# Patient Record
Sex: FEMALE | Race: BLACK OR AFRICAN AMERICAN | NOT HISPANIC OR LATINO | Employment: FULL TIME | URBAN - METROPOLITAN AREA
[De-identification: names, ages, dates, MRNs, and addresses within clinical notes are randomized per-mention and may not be internally consistent; named-entity substitution may affect disease eponyms.]

---

## 2019-10-31 ENCOUNTER — HISTORICAL (OUTPATIENT)
Dept: ADMINISTRATIVE | Facility: HOSPITAL | Age: 59
End: 2019-10-31

## 2019-11-02 LAB — FINAL CULTURE: NORMAL

## 2020-07-22 ENCOUNTER — HISTORICAL (OUTPATIENT)
Dept: ADMINISTRATIVE | Facility: HOSPITAL | Age: 60
End: 2020-07-22

## 2020-07-22 LAB
BILIRUB SERPL-MCNC: NEGATIVE MG/DL
BLOOD URINE, POC: NORMAL
CLARITY, POC UA: CLEAR
COLOR, POC UA: NORMAL
GLUCOSE UR QL STRIP: NEGATIVE
KETONES UR QL STRIP: NEGATIVE
LEUKOCYTE EST, POC UA: NEGATIVE
NITRITE, POC UA: NEGATIVE
PH, POC UA: 7.5
PROTEIN, POC: NEGATIVE
SPECIFIC GRAVITY, POC UA: 1.02
UROBILINOGEN, POC UA: NORMAL

## 2021-07-11 ENCOUNTER — HISTORICAL (OUTPATIENT)
Dept: ADMINISTRATIVE | Facility: HOSPITAL | Age: 61
End: 2021-07-11

## 2021-07-11 LAB — SARS-COV-2 RNA RESP QL NAA+PROBE: DETECTED

## 2021-07-14 ENCOUNTER — HISTORICAL (OUTPATIENT)
Dept: INFECTIOUS DISEASES | Facility: HOSPITAL | Age: 61
End: 2021-07-14

## 2022-01-04 ENCOUNTER — HISTORICAL (OUTPATIENT)
Dept: ADMINISTRATIVE | Facility: HOSPITAL | Age: 62
End: 2022-01-04

## 2022-01-04 LAB
FLUAV AG UPPER RESP QL IA.RAPID: NEGATIVE
FLUBV AG UPPER RESP QL IA.RAPID: NEGATIVE
SARS-COV-2 RNA RESP QL NAA+PROBE: DETECTED
STREP A PCR (OHS): NOT DETECTED

## 2022-04-10 ENCOUNTER — HISTORICAL (OUTPATIENT)
Dept: ADMINISTRATIVE | Facility: HOSPITAL | Age: 62
End: 2022-04-10
Payer: MEDICAID

## 2022-04-11 ENCOUNTER — HISTORICAL (OUTPATIENT)
Dept: ADMINISTRATIVE | Facility: HOSPITAL | Age: 62
End: 2022-04-11
Payer: MEDICAID

## 2022-04-28 VITALS
DIASTOLIC BLOOD PRESSURE: 66 MMHG | WEIGHT: 184.75 LBS | SYSTOLIC BLOOD PRESSURE: 114 MMHG | HEIGHT: 66 IN | BODY MASS INDEX: 29.69 KG/M2 | OXYGEN SATURATION: 100 %

## 2022-04-28 VITALS
SYSTOLIC BLOOD PRESSURE: 114 MMHG | OXYGEN SATURATION: 100 % | DIASTOLIC BLOOD PRESSURE: 66 MMHG | HEIGHT: 66 IN | BODY MASS INDEX: 29.69 KG/M2 | WEIGHT: 184.75 LBS

## 2022-05-03 NOTE — HISTORICAL [facility] C[facility]ER
This is a historical note converted from [employer]. Formatting and pictures may have been removed.  Please reference [employer] for original formatting and attached multimedia. Chief Complaint  Sore throat, weakness, nausea, cough  History of Present Illness  Patient presents to Urgent Care for stated complaint during Covid health crisis.  61-year-old female presented to clinic reporting of a sore throat weakness?and cough that started today?denies any positive exposure to COVID-19?denies any other symptoms  Review of Systems  Constitutional: negative except as stated in HPI  Eye: negative except as stated in HPI  ENT: negative except as stated in HPI  Respiratory: negative except as stated in HPI  Cardiovascular: negative except as stated in HPI  Gastrointestinal: negative except as stated in HPI  Genitourinary: negative except as stated in HPI  Hema/Lymph: negative except as stated in HPI  Endocrine: negative except as stated in HPI  Immunologic: negative except as stated in HPI  Musculoskeletal: negative except as stated in HPI  Integumentary: negative except as stated in HPI  Neurologic: negative except as stated in HPI  Physical Exam  Vitals & Measurements  T:?37.2? ?C (Oral)? HR:?86(Peripheral)? RR:?18? BP:?114/66?  HT:?167.00?cm? WT:?83.800?kg? BMI:?30.05?  Vital signs: Reviewed  General: in no apparent distress, appropriate for age  Head: no signs of head trauma  Eyes: pupils are reactive. ?Extraocular motions intact, conjunctival pink.?  ENT: Bilateral ear canal clear, no edema, no discharge or bleeding. ?Bilateral TMs intact, pearly gray, no effusion, no retraction or perforation or bulging.  Nose: no sinus tenderness, nasal turbinate normal no erythema, clear nasal discharge.  Mouth: posterior pharynx-clear, uvula midline.  Neck :no adenopathy, supple, full range of motion, nontender  Respiratory: clear breath sounds bilaterally. ?No wheezing  Cardiac :regular, no murmur  Musculoskeletal: Moves all extremities,  ambulatory without assistant  Neurologic: Cranial nerves grossly intact, no signs of peripheral neurological deficit  Assessment/Plan  1.?Flu-like symptoms?R68.89  Discussed physical exam findings and test results with patient COVID-19 PCR, flu a?and B PCR, strep PCR pending?we will notify results and treat accordingly?if needed?continue social distancing mask wearing good hand hygiene and follow CDC recommendation about COVID-19, stay hydrated with fluids specially water, Tylenol Motrin as needed for body aches or fever.?Continue medication as prescribed by your PCP.  Instructed patient to notify his primary care provider regarding the visit today and schedule follow-up appointment in 2 to 3 days if needed  Instructed patient to come back to clinic or go to emergency room department if symptom worsens or no improvement or for any other reasons  Questions elicited and answered  Patient verbalized understanding of discharge instructions, will read discharge education instructions  Ordered:  COVID/FLU A&B PCR, Routine collect, Nasopharyngeal Swab, Collected, 01/04/22 20:16:00 CST, Stop date 01/04/22 20:16:00 CST, Nurse collect, Flu-like symptoms  Office/Outpatient Visit Level 3 Established [id number] PC, Flu-like symptoms  Sore throat, 01/04/22 20:41:00 CST  ?  2.?Sore throat?J02.9  ?As discussed in #1  Ordered:  Office/Outpatient Visit Level 3 Established 96608 PC, Flu-like symptoms  Sore throat, 01/04/22 20:41:00 CST  Strep Group A by PCR, Stat collect, Throat, Collected, 01/04/22 20:17:00 CST, Stop date 01/04/22 20:17:00 CST, Nurse collect, Sore throat  ?   Problem List/Past Medical History  Ongoing  Hypothyroid  Historical  Hypothyroid  Procedure/Surgical History  Casirivi and imdevi inj (07/14/2021)  Repair of wrist   Medications  amLODIPine 5 mg oral tablet, 5 mg= 1 tab(s), Oral, Daily  Claritin 10 mg oral tablet, 10 mg= 1 tab(s), Oral, Daily, 1 refills  cyclobenzaprine 5 mg oral tablet, 5 mg= 1 tab(s), Oral, At  Bedtime, PRN  Flonase 50 mcg/inh nasal spray, 2 spray(s), Nasal, Daily, 1 refills  hydrochlorothiazide 25 mg oral tablet, 25 mg= 1 tab(s), Oral, Daily  meloxicam 7.5 mg oral tablet, 7.5 mg= 1 tab(s), Oral, Daily, PRN, 2 refills  paroxetine 10 mg oral tablet, 10 mg= 1 tab(s), Oral, qAM  Synthroid 75 mcg (0.075 mg) oral tablet, 0.075 mg= 1 tab(s), Oral, Daily  Allergies  No Known Allergies  Social History  Abuse/Neglect  No, No, Yes, 07/22/2020  Alcohol - Denies Alcohol Use, 07/25/2012  Never, 10/31/2019  Substance Use - Denies Substance Abuse, 07/25/2012  Tobacco  Former smoker, quit more than 30 days ago, N/A, 07/22/2020  Family History  Cancer: Father.  Diabetes mellitus: Father.  Hypertension.: Father.  Lupus: Mother.  Health Maintenance  Health Maintenance  ???Pending?(in the next year)  ??? ??OverDue  ??? ? ? ?Cervical Cancer Screening due??09/08/11??and every 3??year(s)  ??? ? ? ?Breast Cancer Screening due??12/08/11??and every 2??year(s)  ??? ? ? ?Diabetes Screening due??11/15/18??and every 3??year(s)  ??? ? ? ?Influenza Vaccine due??10/01/21??and every 1??day(s)  ??? ??Due?  ??? ? ? ?Alcohol Misuse Screening due??01/02/22??and every 1??year(s)  ??? ? ? ?Aspirin Therapy for CVD Prevention due??01/04/22??and every 1??year(s)  ??? ? ? ?Colorectal Screening due??01/04/22??Unknown Frequency  ??? ? ? ?Lipid Screening due??01/04/22??Unknown Frequency  ??? ? ? ?Tetanus Vaccine due??01/04/22??and every 10??year(s)  ??? ? ? ?Zoster Vaccine due??01/04/22??Unknown Frequency  ??? ??Due In Future?  ??? ? ? ?Obesity Screening not due until??01/01/23??and every 1??year(s)  ???Satisfied?(in the past 1 year)  ??? ??Satisfied?  ??? ? ? ?ADL Screening on??01/04/22.??Satisfied by Lyly Pérez  ??? ? ? ?Blood Pressure Screening on??01/04/22.??Satisfied by Lyly Pérez  ??? ? ? ?Body Mass Index Check on??01/04/22.??Satisfied by Lyly Pérez  ??? ? ? ?Depression Screening on??01/04/22.??Satisfied by Lyly Pérez  ??? ? ?  ?Influenza Vaccine on??01/04/22.??Satisfied by Lyly Pérez  ??? ? ? ?Obesity Screening on??01/04/22.??Satisfied by Lyly Pérez  ?

## 2022-08-05 ENCOUNTER — OFFICE VISIT (OUTPATIENT)
Dept: URGENT CARE | Facility: CLINIC | Age: 62
End: 2022-08-05
Payer: MEDICAID

## 2022-08-05 VITALS
OXYGEN SATURATION: 100 % | BODY MASS INDEX: 30.47 KG/M2 | HEIGHT: 66 IN | HEART RATE: 63 BPM | WEIGHT: 189.63 LBS | RESPIRATION RATE: 19 BRPM | DIASTOLIC BLOOD PRESSURE: 78 MMHG | SYSTOLIC BLOOD PRESSURE: 128 MMHG | TEMPERATURE: 98 F

## 2022-08-05 DIAGNOSIS — U07.1 COVID: ICD-10-CM

## 2022-08-05 DIAGNOSIS — H66.92 LEFT OTITIS MEDIA, UNSPECIFIED OTITIS MEDIA TYPE: Primary | ICD-10-CM

## 2022-08-05 DIAGNOSIS — Z11.52 ENCOUNTER FOR SCREENING FOR COVID-19: ICD-10-CM

## 2022-08-05 DIAGNOSIS — R68.89 FLU-LIKE SYMPTOMS: ICD-10-CM

## 2022-08-05 DIAGNOSIS — J02.9 SORE THROAT: ICD-10-CM

## 2022-08-05 DIAGNOSIS — U07.1 COVID-19 VIRUS DETECTED: ICD-10-CM

## 2022-08-05 LAB
CTP QC/QA: YES
SARS-COV-2 RDRP RESP QL NAA+PROBE: POSITIVE

## 2022-08-05 PROCEDURE — U0002 COVID-19 LAB TEST NON-CDC: HCPCS | Mod: PBBFAC | Performed by: NURSE PRACTITIONER

## 2022-08-05 PROCEDURE — 99213 OFFICE O/P EST LOW 20 MIN: CPT | Mod: S$PBB,,, | Performed by: NURSE PRACTITIONER

## 2022-08-05 PROCEDURE — 99213 OFFICE O/P EST LOW 20 MIN: CPT | Mod: PBBFAC | Performed by: NURSE PRACTITIONER

## 2022-08-05 PROCEDURE — 99213 PR OFFICE/OUTPT VISIT, EST, LEVL III, 20-29 MIN: ICD-10-PCS | Mod: S$PBB,,, | Performed by: NURSE PRACTITIONER

## 2022-08-05 RX ORDER — AMLODIPINE BESYLATE 5 MG/1
5 TABLET ORAL DAILY
COMMUNITY
Start: 2022-06-20 | End: 2023-04-04

## 2022-08-05 RX ORDER — LEVOTHYROXINE SODIUM 75 UG/1
75 TABLET ORAL EVERY MORNING
COMMUNITY
Start: 2022-07-28

## 2022-08-05 RX ORDER — HYDROCHLOROTHIAZIDE 25 MG/1
25 TABLET ORAL DAILY
COMMUNITY
Start: 2022-05-13 | End: 2023-04-04

## 2022-08-05 RX ORDER — FLUTICASONE PROPIONATE 50 MCG
1 SPRAY, SUSPENSION (ML) NASAL 2 TIMES DAILY
Status: ON HOLD | COMMUNITY
Start: 2022-08-03 | End: 2023-01-11 | Stop reason: HOSPADM

## 2022-08-05 RX ORDER — LORATADINE 10 MG/1
1 TABLET ORAL DAILY
COMMUNITY
Start: 2022-05-13 | End: 2023-06-29

## 2022-08-05 RX ORDER — AMOXICILLIN 875 MG/1
875 TABLET, FILM COATED ORAL 2 TIMES DAILY
Qty: 20 TABLET | Refills: 0 | Status: SHIPPED | OUTPATIENT
Start: 2022-08-05 | End: 2022-08-15

## 2022-08-05 NOTE — LETTER
August 5, 2022      [facility]  [address] [address]  Phone: [phone]       Patient: Maricel Sepulveda   YOB: 1960  Date of Visit: 08/05/2022    To Whom It May Concern:    Tevin Sepulveda  was at [facility] on 08/05/2022. The patient may return to work/school on 08/11/2022 with no restrictions. If you have any questions or concerns, or if I can be of further assistance, please do not hesitate to contact me.    Sincerely,    Bettye Deleon LPN

## 2022-08-05 NOTE — PROGRESS NOTES
"Subjective:       Patient ID: Maricel Sepulveda is a 62 y.o. female.    Vitals:  height is 5' 5.75" (1.67 m) and weight is 86 kg (189 lb 9.6 oz). Her oral temperature is 98 °F (36.7 °C). Her blood pressure is 128/78 and her pulse is 63. Her respiration is 19 and oxygen saturation is 100%.     Chief Complaint: Nasal Congestion, Cough, and Sore Throat (Pt reports positive covid at home)    Pt is a 62 y.o. female who complains of congestion, headache and bilateral sinus pain for 10 days.   denies  shortness of breath and wheezing. Takin claritin d, flonase with little relief.         Constitution: Negative.   HENT: Positive for ear pain, congestion, sinus pain and sinus pressure.    Neck: neck negative.   Cardiovascular: Negative.    Respiratory: Negative.        Objective:      Physical Exam   Constitutional: She is oriented to person, place, and time. She appears well-developed.   HENT:   Head: Normocephalic.   Ears:   Right Ear: Tympanic membrane normal.   Left Ear: A middle ear effusion is present.   Nose: Congestion present.   Eyes: Conjunctivae and EOM are normal. Pupils are equal, round, and reactive to light.   Neck: Neck supple.   Cardiovascular: Normal rate, regular rhythm and normal heart sounds.   Pulmonary/Chest: Effort normal and breath sounds normal.   Musculoskeletal: Normal range of motion.         General: Normal range of motion.   Neurological: She is alert and oriented to person, place, and time.   Skin: Skin is warm and dry. Capillary refill takes less than 2 seconds.   Psychiatric: Her behavior is normal.   Vitals reviewed.        Assessment:       1. Left otitis media, unspecified otitis media type    2. Encounter for screening for COVID-19    3. Flu-like symptoms    4. Sore throat    5. COVID            Office Visit on 08/05/2022   Component Date Value Ref Range Status    POC Rapid COVID 08/05/2022 Positive (A) Negative Final     Acceptable 08/05/2022 Yes   Final        No results " found.   Plan:       Medication as ordered. May use humidifier. If no improvement in symptoms after medication complete then rtc.     Left otitis media, unspecified otitis media type  -     amoxicillin (AMOXIL) 875 MG tablet; Take 1 tablet (875 mg total) by mouth 2 (two) times daily. for 10 days  Dispense: 20 tablet; Refill: 0    Encounter for screening for COVID-19  -     POCT COVID-19 Rapid Screening    Flu-like symptoms  -     Cancel: POCT Influenza A/B    Sore throat  -     Cancel: POCT Strep A, Molecular    COVID

## 2023-01-05 ENCOUNTER — OFFICE VISIT (OUTPATIENT)
Dept: URGENT CARE | Facility: CLINIC | Age: 63
End: 2023-01-05
Payer: COMMERCIAL

## 2023-01-05 VITALS
DIASTOLIC BLOOD PRESSURE: 78 MMHG | SYSTOLIC BLOOD PRESSURE: 131 MMHG | TEMPERATURE: 98 F | RESPIRATION RATE: 18 BRPM | BODY MASS INDEX: 31.72 KG/M2 | HEIGHT: 65 IN | WEIGHT: 190.38 LBS | OXYGEN SATURATION: 100 % | HEART RATE: 66 BPM

## 2023-01-05 DIAGNOSIS — L03.012 CHRONIC PARONYCHIA OF FINGER OF LEFT HAND: Primary | ICD-10-CM

## 2023-01-05 PROCEDURE — 99214 PR OFFICE/OUTPT VISIT, EST, LEVL IV, 30-39 MIN: ICD-10-PCS | Mod: S$PBB,,, | Performed by: FAMILY MEDICINE

## 2023-01-05 PROCEDURE — 99215 OFFICE O/P EST HI 40 MIN: CPT | Mod: PBBFAC | Performed by: FAMILY MEDICINE

## 2023-01-05 PROCEDURE — 99214 OFFICE O/P EST MOD 30 MIN: CPT | Mod: S$PBB,,, | Performed by: FAMILY MEDICINE

## 2023-01-05 RX ORDER — DICLOFENAC SODIUM 10 MG/G
2 GEL TOPICAL 4 TIMES DAILY
COMMUNITY
Start: 2022-05-13

## 2023-01-05 RX ORDER — ESTRADIOL 0.03 MG/D
1 PATCH TRANSDERMAL
COMMUNITY
Start: 2023-01-02

## 2023-01-05 RX ORDER — HYDROXYZINE PAMOATE 25 MG/1
50 CAPSULE ORAL EVERY 8 HOURS PRN
COMMUNITY
Start: 2022-12-28 | End: 2023-04-04

## 2023-01-05 RX ORDER — PROGESTERONE 100 MG/1
100 CAPSULE ORAL NIGHTLY
COMMUNITY
Start: 2023-01-04

## 2023-01-05 RX ORDER — SULFAMETHOXAZOLE AND TRIMETHOPRIM 800; 160 MG/1; MG/1
1 TABLET ORAL 2 TIMES DAILY
Qty: 20 TABLET | Refills: 0 | Status: ON HOLD | OUTPATIENT
Start: 2023-01-05 | End: 2023-01-11 | Stop reason: HOSPADM

## 2023-01-05 RX ORDER — FLUCONAZOLE 150 MG/1
150 TABLET ORAL ONCE
Qty: 2 TABLET | Refills: 0 | Status: SHIPPED | OUTPATIENT
Start: 2023-01-05 | End: 2023-01-05

## 2023-01-05 RX ORDER — MELOXICAM 7.5 MG/1
7.5 TABLET ORAL DAILY PRN
COMMUNITY
Start: 2022-11-10 | End: 2023-04-04

## 2023-01-05 RX ORDER — PAROXETINE 10 MG/1
10 TABLET, FILM COATED ORAL
Status: ON HOLD | COMMUNITY
Start: 2022-01-04 | End: 2023-01-11 | Stop reason: HOSPADM

## 2023-01-05 RX ORDER — VENLAFAXINE HYDROCHLORIDE 75 MG/1
75 CAPSULE, EXTENDED RELEASE ORAL
Status: ON HOLD | COMMUNITY
Start: 2022-11-10 | End: 2023-01-11 | Stop reason: HOSPADM

## 2023-01-05 RX ORDER — TRIAMCINOLONE ACETONIDE 1 MG/G
OINTMENT TOPICAL
Status: ON HOLD | COMMUNITY
Start: 2022-12-28 | End: 2023-01-11 | Stop reason: HOSPADM

## 2023-01-05 NOTE — PROGRESS NOTES
"Subjective:       Patient ID: Maricel Sepulveda is a 62 y.o. female.    Vitals:  height is 5' 5" (1.651 m) and weight is 86.4 kg (190 lb 6.4 oz). Her oral temperature is 98.2 °F (36.8 °C). Her blood pressure is 131/78 and her pulse is 66. Her respiration is 18 and oxygen saturation is 100%.     Chief Complaint: Hand Pain (L thumb swelling, pain, drainage x4 days. No injury. )    Hand Pain       History of chronic rash on nail folds of bilateral thumbs.  Several week history of enlarging nodule on the left, occasionally bleeds, no draining a small amount of purulence.  No fever.  Does lots of hand washing at work, [employer].  ROS    Constitutional: negative except as stated in HPI  Eye: negative except as stated in HPI  ENT: negative except as stated in HPI  Respiratory: negative except as stated in HPI  Cardiovascular: negative except as stated in HPI  Gastrointestinal: negative except as stated in HPI  Genitourinary: negative except as stated in HPI  Objective:      Physical Exam   Constitutional: She appears well-developed.   HENT:   Head: Atraumatic.   Skin: Skin is warm and dry. Capillary refill takes less than 2 seconds.         Comments: Has what appears to be chronic paronychia bilateral thumbs.  The left proximal nail fold has a pyogenic granuloma with a small amount of purulent drainage.  Median nail dystrophy bilaterally.       Assessment:       1. Chronic paronychia of finger of left hand            Plan:         Chronic paronychia of finger of left hand  -     Ambulatory referral/consult to Family Practice    Other orders  -     sulfamethoxazole-trimethoprim 800-160mg (BACTRIM DS) 800-160 mg Tab; Take 1 tablet by mouth 2 (two) times daily. for 10 days  Dispense: 20 tablet; Refill: 0  -     fluconazole (DIFLUCAN) 150 MG Tab; Take 1 tablet (150 mg total) by mouth once. Repeat dose in 3 days for 1 dose  Dispense: 2 tablet; Refill: 0         From this setting, will give a course of Bactrim p.o. Patient requesting " Diflucan as vulvovaginal candidiasis preventative.  Will send her to Family Medicine minor surgery for eval of left thumb pyogenic granuloma.  She will need to follow-up with her PCP for appropriate management of what is likely chronic paronychia.

## 2023-01-05 NOTE — LETTER
January 5, 2023      [facility]  [address] [address]  Phone: [phone]       Patient: Maricel Sepulveda   YOB: 1960  Date of Visit: 01/05/2023    To Whom It May Concern:    Tevin Sepulveda  was at [facility] on 01/05/2023. The patient may return to work/school on JAN 7 2023 with no restrictions. If you have any questions or concerns, or if I can be of further assistance, please do not hesitate to contact me.    Sincerely,    REAGAN JOSHUA MD

## 2023-01-10 ENCOUNTER — HOSPITAL ENCOUNTER (OUTPATIENT)
Facility: HOSPITAL | Age: 63
Discharge: HOME | End: 2023-01-11
Attending: EMERGENCY MEDICINE | Admitting: STUDENT IN AN ORGANIZED HEALTH CARE EDUCATION/TRAINING PROGRAM
Payer: COMMERCIAL

## 2023-01-10 ENCOUNTER — OFFICE VISIT (OUTPATIENT)
Dept: URGENT CARE | Facility: CLINIC | Age: 63
End: 2023-01-10
Payer: COMMERCIAL

## 2023-01-10 VITALS
SYSTOLIC BLOOD PRESSURE: 42 MMHG | HEIGHT: 65 IN | WEIGHT: 184 LBS | DIASTOLIC BLOOD PRESSURE: 23 MMHG | OXYGEN SATURATION: 99 % | RESPIRATION RATE: 24 BRPM | TEMPERATURE: 99 F | HEART RATE: 78 BPM | BODY MASS INDEX: 30.66 KG/M2

## 2023-01-10 DIAGNOSIS — R53.1 WEAKNESS GENERALIZED: ICD-10-CM

## 2023-01-10 DIAGNOSIS — R53.1 WEAKNESS: ICD-10-CM

## 2023-01-10 DIAGNOSIS — R06.00 DYSPNEA: ICD-10-CM

## 2023-01-10 DIAGNOSIS — R53.1 WEAKNESS: Primary | ICD-10-CM

## 2023-01-10 DIAGNOSIS — I95.9 HYPOTENSION, UNSPECIFIED HYPOTENSION TYPE: ICD-10-CM

## 2023-01-10 DIAGNOSIS — R06.02 SOB (SHORTNESS OF BREATH): ICD-10-CM

## 2023-01-10 DIAGNOSIS — R06.09 DYSPNEA ON EXERTION: Primary | ICD-10-CM

## 2023-01-10 PROBLEM — E87.20 LACTIC ACIDOSIS: Status: ACTIVE | Noted: 2023-01-10

## 2023-01-10 LAB
ALBUMIN SERPL-MCNC: 4.1 G/DL (ref 3.4–4.8)
ALBUMIN/GLOB SERPL: 0.9 RATIO (ref 1.1–2)
ALP SERPL-CCNC: 101 UNIT/L (ref 40–150)
ALT SERPL-CCNC: 15 UNIT/L (ref 0–55)
AMPHET UR QL SCN: NEGATIVE
AORTIC ROOT ANNULUS: 3 CM
APPEARANCE UR: CLEAR
AST SERPL-CCNC: 21 UNIT/L (ref 5–34)
AV INDEX (PROSTH): 0.78
AV MEAN GRADIENT: 4 MMHG
AV PEAK GRADIENT: 7 MMHG
AV VALVE AREA: 2.43 CM2
AV VELOCITY RATIO: 0.88
BACTERIA #/AREA URNS AUTO: ABNORMAL /HPF
BARBITURATE SCN PRESENT UR: NEGATIVE
BASOPHILS # BLD AUTO: 0.02 X10(3)/MCL (ref 0–0.2)
BASOPHILS NFR BLD AUTO: 0.4 %
BENZODIAZ UR QL SCN: NEGATIVE
BILIRUB UR QL STRIP.AUTO: NEGATIVE MG/DL
BILIRUBIN DIRECT+TOT PNL SERPL-MCNC: 0.6 MG/DL
BNP BLD-MCNC: 28.4 PG/ML
BSA FOR ECHO PROCEDURE: 1.99 M2
BUN SERPL-MCNC: 12.3 MG/DL (ref 9.8–20.1)
CALCIUM SERPL-MCNC: 9.9 MG/DL (ref 8.4–10.2)
CANNABINOIDS UR QL SCN: NEGATIVE
CHLORIDE SERPL-SCNC: 99 MMOL/L (ref 98–107)
CHOLEST SERPL-MCNC: 207 MG/DL
CHOLEST/HDLC SERPL: 4 {RATIO} (ref 0–5)
CK MB SERPL-MCNC: 0.5 NG/ML
CK SERPL-CCNC: 121 U/L (ref 29–168)
CO2 SERPL-SCNC: 25 MMOL/L (ref 23–31)
COCAINE UR QL SCN: NEGATIVE
COLOR UR AUTO: COLORLESS
CREAT SERPL-MCNC: 1.19 MG/DL (ref 0.55–1.02)
CV ECHO LV RWT: 0.39 CM
DOP CALC AO PEAK VEL: 1.36 M/S
DOP CALC AO VTI: 27.1 CM
DOP CALC LVOT AREA: 3.1 CM2
DOP CALC LVOT DIAMETER: 1.99 CM
DOP CALC LVOT PEAK VEL: 1.2 M/S
DOP CALC LVOT STROKE VOLUME: 65.9 CM3
DOP CALC MV VTI: 30.9 CM
DOP CALCLVOT PEAK VEL VTI: 21.2 CM
E WAVE DECELERATION TIME: 212.38 MSEC
E/A RATIO: 1.39
E/E' RATIO: 7.1 M/S
ECHO LV POSTERIOR WALL: 0.83 CM (ref 0.6–1.1)
EJECTION FRACTION: 65 %
EOSINOPHIL # BLD AUTO: 0.01 X10(3)/MCL (ref 0–0.9)
EOSINOPHIL NFR BLD AUTO: 0.2 %
ERYTHROCYTE [DISTWIDTH] IN BLOOD BY AUTOMATED COUNT: 14 % (ref 11.5–17)
EST. AVERAGE GLUCOSE BLD GHB EST-MCNC: 105.4 MG/DL
FENTANYL UR QL SCN: NEGATIVE
FLUAV AG UPPER RESP QL IA.RAPID: NOT DETECTED
FLUBV AG UPPER RESP QL IA.RAPID: NOT DETECTED
FRACTIONAL SHORTENING: 34 % (ref 28–44)
GFR SERPLBLD CREATININE-BSD FMLA CKD-EPI: 52 MLS/MIN/1.73/M2
GLOBULIN SER-MCNC: 4.4 GM/DL (ref 2.4–3.5)
GLUCOSE SERPL-MCNC: 132 MG/DL (ref 82–115)
GLUCOSE UR QL STRIP.AUTO: NORMAL MG/DL
HBA1C MFR BLD: 5.3 %
HCT VFR BLD AUTO: 42.8 % (ref 37–47)
HDLC SERPL-MCNC: 52 MG/DL (ref 35–60)
HGB BLD-MCNC: 14.2 GM/DL (ref 12–16)
HYALINE CASTS #/AREA URNS LPF: ABNORMAL /LPF
IMM GRANULOCYTES # BLD AUTO: 0.02 X10(3)/MCL (ref 0–0.04)
IMM GRANULOCYTES NFR BLD AUTO: 0.4 %
INTERVENTRICULAR SEPTUM: 0.91 CM (ref 0.6–1.1)
KETONES UR QL STRIP.AUTO: NEGATIVE MG/DL
LACTATE SERPL-SCNC: 2 MMOL/L (ref 0.5–2.2)
LACTATE SERPL-SCNC: 4.7 MMOL/L (ref 0.5–2.2)
LDLC SERPL CALC-MCNC: 137 MG/DL (ref 50–140)
LEFT ATRIUM SIZE: 3.61 CM
LEFT ATRIUM VOLUME INDEX MOD: 15.5 ML/M2
LEFT ATRIUM VOLUME MOD: 30 CM3
LEFT INTERNAL DIMENSION IN SYSTOLE: 2.8 CM (ref 2.1–4)
LEFT VENTRICLE DIASTOLIC VOLUME INDEX: 41.92 ML/M2
LEFT VENTRICLE DIASTOLIC VOLUME: 81.33 ML
LEFT VENTRICLE MASS INDEX: 60 G/M2
LEFT VENTRICLE SYSTOLIC VOLUME INDEX: 15.2 ML/M2
LEFT VENTRICLE SYSTOLIC VOLUME: 29.48 ML
LEFT VENTRICULAR INTERNAL DIMENSION IN DIASTOLE: 4.26 CM (ref 3.5–6)
LEFT VENTRICULAR MASS: 115.99 G
LEUKOCYTE ESTERASE UR QL STRIP.AUTO: NEGATIVE UNIT/L
LV LATERAL E/E' RATIO: 6.47 M/S
LV SEPTAL E/E' RATIO: 7.86 M/S
LVOT MG: 3.25 MMHG
LVOT MV: 0.85 CM/S
LYMPHOCYTES # BLD AUTO: 0.42 X10(3)/MCL (ref 0.6–4.6)
LYMPHOCYTES NFR BLD AUTO: 8.1 %
MCH RBC QN AUTO: 28.3 PG
MCHC RBC AUTO-ENTMCNC: 33.2 MG/DL (ref 33–36)
MCV RBC AUTO: 85.3 FL (ref 80–94)
MDMA UR QL SCN: NEGATIVE
MONOCYTES # BLD AUTO: 0.55 X10(3)/MCL (ref 0.1–1.3)
MONOCYTES NFR BLD AUTO: 10.6 %
MUCOUS THREADS URNS QL MICRO: ABNORMAL /LPF
MV MEAN GRADIENT: 2 MMHG
MV PEAK A VEL: 0.79 M/S
MV PEAK E VEL: 1.1 M/S
MV PEAK GRADIENT: 5 MMHG
MV STENOSIS PRESSURE HALF TIME: 61.59 MS
MV VALVE AREA BY CONTINUITY EQUATION: 2.13 CM2
MV VALVE AREA P 1/2 METHOD: 3.57 CM2
NEUTROPHILS # BLD AUTO: 4.18 X10(3)/MCL (ref 2.1–9.2)
NEUTROPHILS NFR BLD AUTO: 80.3 %
NITRITE UR QL STRIP.AUTO: NEGATIVE
NRBC BLD AUTO-RTO: 0 %
OPIATES UR QL SCN: NEGATIVE
PCP UR QL: NEGATIVE
PH UR STRIP.AUTO: 7 [PH]
PH UR: 7 [PH] (ref 3–11)
PISA TR MAX VEL: 2.48 M/S
PLATELET # BLD AUTO: 193 X10(3)/MCL (ref 130–400)
PMV BLD AUTO: 11 FL (ref 7.4–10.4)
POCT GLUCOSE: 121 MG/DL (ref 70–110)
POTASSIUM SERPL-SCNC: 3.6 MMOL/L (ref 3.5–5.1)
PROT SERPL-MCNC: 8.5 GM/DL (ref 5.8–7.6)
PROT UR QL STRIP.AUTO: NEGATIVE MG/DL
RBC # BLD AUTO: 5.02 X10(6)/MCL (ref 4.2–5.4)
RBC #/AREA URNS AUTO: ABNORMAL /HPF
RBC UR QL AUTO: NEGATIVE UNIT/L
RIGHT VENTRICULAR END-DIASTOLIC DIMENSION: 2.91 CM
SARS-COV-2 RNA RESP QL NAA+PROBE: NOT DETECTED
SODIUM SERPL-SCNC: 135 MMOL/L (ref 136–145)
SP GR UR STRIP.AUTO: 1.02
SPECIFIC GRAVITY, URINE AUTO (.000) (OHS): 1.02 (ref 1–1.03)
SQUAMOUS #/AREA URNS LPF: ABNORMAL /HPF
T4 FREE SERPL-MCNC: 1 NG/DL (ref 0.7–1.48)
TDI LATERAL: 0.17 M/S
TDI SEPTAL: 0.14 M/S
TDI: 0.16 M/S
TR MAX PG: 25 MMHG
TRIGL SERPL-MCNC: 90 MG/DL (ref 37–140)
TROPONIN I SERPL-MCNC: <0.01 NG/ML (ref 0–0.04)
TSH SERPL-ACNC: 0.58 UIU/ML (ref 0.35–4.94)
UROBILINOGEN UR STRIP-ACNC: NORMAL MG/DL
VLDLC SERPL CALC-MCNC: 18 MG/DL
WBC # SPEC AUTO: 5.2 X10(3)/MCL (ref 4.5–11.5)
WBC #/AREA URNS AUTO: ABNORMAL /HPF

## 2023-01-10 PROCEDURE — 99214 PR OFFICE/OUTPT VISIT, EST, LEVL IV, 30-39 MIN: ICD-10-PCS | Mod: S$PBB,,,

## 2023-01-10 PROCEDURE — 83880 ASSAY OF NATRIURETIC PEPTIDE: CPT | Performed by: EMERGENCY MEDICINE

## 2023-01-10 PROCEDURE — 87077 CULTURE AEROBIC IDENTIFY: CPT | Performed by: EMERGENCY MEDICINE

## 2023-01-10 PROCEDURE — 96374 THER/PROPH/DIAG INJ IV PUSH: CPT | Mod: 59

## 2023-01-10 PROCEDURE — G0378 HOSPITAL OBSERVATION PER HR: HCPCS

## 2023-01-10 PROCEDURE — 25000003 PHARM REV CODE 250: Performed by: EMERGENCY MEDICINE

## 2023-01-10 PROCEDURE — 82962 GLUCOSE BLOOD TEST: CPT

## 2023-01-10 PROCEDURE — 83605 ASSAY OF LACTIC ACID: CPT | Performed by: EMERGENCY MEDICINE

## 2023-01-10 PROCEDURE — 99214 OFFICE O/P EST MOD 30 MIN: CPT | Mod: S$PBB,,,

## 2023-01-10 PROCEDURE — 84443 ASSAY THYROID STIM HORMONE: CPT | Performed by: STUDENT IN AN ORGANIZED HEALTH CARE EDUCATION/TRAINING PROGRAM

## 2023-01-10 PROCEDURE — 82550 ASSAY OF CK (CPK): CPT | Performed by: EMERGENCY MEDICINE

## 2023-01-10 PROCEDURE — 84484 ASSAY OF TROPONIN QUANT: CPT | Performed by: EMERGENCY MEDICINE

## 2023-01-10 PROCEDURE — 25500020 PHARM REV CODE 255: Performed by: INTERNAL MEDICINE

## 2023-01-10 PROCEDURE — 96361 HYDRATE IV INFUSION ADD-ON: CPT

## 2023-01-10 PROCEDURE — 25000003 PHARM REV CODE 250: Performed by: STUDENT IN AN ORGANIZED HEALTH CARE EDUCATION/TRAINING PROGRAM

## 2023-01-10 PROCEDURE — 0240U COVID/FLU A&B PCR: CPT | Performed by: STUDENT IN AN ORGANIZED HEALTH CARE EDUCATION/TRAINING PROGRAM

## 2023-01-10 PROCEDURE — 80307 DRUG TEST PRSMV CHEM ANLYZR: CPT | Performed by: EMERGENCY MEDICINE

## 2023-01-10 PROCEDURE — 82553 CREATINE MB FRACTION: CPT | Performed by: EMERGENCY MEDICINE

## 2023-01-10 PROCEDURE — 84439 ASSAY OF FREE THYROXINE: CPT | Performed by: STUDENT IN AN ORGANIZED HEALTH CARE EDUCATION/TRAINING PROGRAM

## 2023-01-10 PROCEDURE — 99285 EMERGENCY DEPT VISIT HI MDM: CPT | Mod: 25,27

## 2023-01-10 PROCEDURE — 85025 COMPLETE CBC W/AUTO DIFF WBC: CPT | Performed by: EMERGENCY MEDICINE

## 2023-01-10 PROCEDURE — 99213 OFFICE O/P EST LOW 20 MIN: CPT | Mod: PBBFAC,25

## 2023-01-10 PROCEDURE — 80053 COMPREHEN METABOLIC PANEL: CPT | Performed by: EMERGENCY MEDICINE

## 2023-01-10 PROCEDURE — 63600175 PHARM REV CODE 636 W HCPCS: Performed by: STUDENT IN AN ORGANIZED HEALTH CARE EDUCATION/TRAINING PROGRAM

## 2023-01-10 PROCEDURE — 83036 HEMOGLOBIN GLYCOSYLATED A1C: CPT | Performed by: EMERGENCY MEDICINE

## 2023-01-10 PROCEDURE — 81001 URINALYSIS AUTO W/SCOPE: CPT | Mod: 59 | Performed by: EMERGENCY MEDICINE

## 2023-01-10 PROCEDURE — 25000003 PHARM REV CODE 250

## 2023-01-10 PROCEDURE — 96372 THER/PROPH/DIAG INJ SC/IM: CPT | Performed by: STUDENT IN AN ORGANIZED HEALTH CARE EDUCATION/TRAINING PROGRAM

## 2023-01-10 PROCEDURE — 80061 LIPID PANEL: CPT | Performed by: EMERGENCY MEDICINE

## 2023-01-10 PROCEDURE — 25500020 PHARM REV CODE 255

## 2023-01-10 PROCEDURE — 93005 ELECTROCARDIOGRAM TRACING: CPT

## 2023-01-10 RX ORDER — SODIUM CHLORIDE 0.9 % (FLUSH) 0.9 %
10 SYRINGE (ML) INJECTION
Status: DISCONTINUED | OUTPATIENT
Start: 2023-01-10 | End: 2023-01-11 | Stop reason: HOSPADM

## 2023-01-10 RX ORDER — VENLAFAXINE HYDROCHLORIDE 75 MG/1
75 CAPSULE, EXTENDED RELEASE ORAL DAILY
Status: DISCONTINUED | OUTPATIENT
Start: 2023-01-10 | End: 2023-01-11

## 2023-01-10 RX ORDER — PAROXETINE 10 MG/1
10 TABLET, FILM COATED ORAL DAILY
Status: DISCONTINUED | OUTPATIENT
Start: 2023-01-10 | End: 2023-01-11

## 2023-01-10 RX ORDER — SODIUM CHLORIDE, SODIUM LACTATE, POTASSIUM CHLORIDE, CALCIUM CHLORIDE 600; 310; 30; 20 MG/100ML; MG/100ML; MG/100ML; MG/100ML
INJECTION, SOLUTION INTRAVENOUS CONTINUOUS
Status: DISCONTINUED | OUTPATIENT
Start: 2023-01-10 | End: 2023-01-11 | Stop reason: HOSPADM

## 2023-01-10 RX ORDER — TALC
6 POWDER (GRAM) TOPICAL NIGHTLY PRN
Status: DISCONTINUED | OUTPATIENT
Start: 2023-01-10 | End: 2023-01-11 | Stop reason: HOSPADM

## 2023-01-10 RX ORDER — PROGESTERONE 100 MG/1
100 CAPSULE ORAL NIGHTLY
Status: DISCONTINUED | OUTPATIENT
Start: 2023-01-10 | End: 2023-01-11 | Stop reason: HOSPADM

## 2023-01-10 RX ORDER — ONDANSETRON 4 MG/1
4 TABLET, ORALLY DISINTEGRATING ORAL EVERY 6 HOURS PRN
Status: DISCONTINUED | OUTPATIENT
Start: 2023-01-10 | End: 2023-01-11 | Stop reason: HOSPADM

## 2023-01-10 RX ORDER — FAMOTIDINE 20 MG/1
20 TABLET, FILM COATED ORAL DAILY
Status: DISCONTINUED | OUTPATIENT
Start: 2023-01-10 | End: 2023-01-11 | Stop reason: HOSPADM

## 2023-01-10 RX ORDER — ASPIRIN 325 MG
325 TABLET ORAL
Status: COMPLETED | OUTPATIENT
Start: 2023-01-10 | End: 2023-01-10

## 2023-01-10 RX ORDER — ENOXAPARIN SODIUM 100 MG/ML
40 INJECTION SUBCUTANEOUS EVERY 24 HOURS
Status: DISCONTINUED | OUTPATIENT
Start: 2023-01-10 | End: 2023-01-11 | Stop reason: HOSPADM

## 2023-01-10 RX ORDER — LEVOTHYROXINE SODIUM 75 UG/1
75 TABLET ORAL EVERY MORNING
Status: DISCONTINUED | OUTPATIENT
Start: 2023-01-10 | End: 2023-01-11 | Stop reason: HOSPADM

## 2023-01-10 RX ADMIN — LEVOTHYROXINE SODIUM 75 MCG: 0.07 TABLET ORAL at 02:01

## 2023-01-10 RX ADMIN — HUMAN ALBUMIN MICROSPHERES AND PERFLUTREN 0.44 MG: 10; .22 INJECTION, SOLUTION INTRAVENOUS at 02:01

## 2023-01-10 RX ADMIN — IOHEXOL 100 ML: 350 INJECTION, SOLUTION INTRAVENOUS at 02:01

## 2023-01-10 RX ADMIN — PAROXETINE 10 MG: 10 TABLET, FILM COATED ORAL at 02:01

## 2023-01-10 RX ADMIN — ASPIRIN 325 MG ORAL TABLET 325 MG: 325 PILL ORAL at 11:01

## 2023-01-10 RX ADMIN — SODIUM CHLORIDE 2586 ML: 9 INJECTION, SOLUTION INTRAVENOUS at 11:01

## 2023-01-10 RX ADMIN — ONDANSETRON 4 MG: 4 TABLET, ORALLY DISINTEGRATING ORAL at 10:01

## 2023-01-10 RX ADMIN — FAMOTIDINE 20 MG: 20 TABLET, FILM COATED ORAL at 02:01

## 2023-01-10 RX ADMIN — VENLAFAXINE HYDROCHLORIDE 75 MG: 75 CAPSULE, EXTENDED RELEASE ORAL at 02:01

## 2023-01-10 RX ADMIN — SODIUM CHLORIDE, POTASSIUM CHLORIDE, SODIUM LACTATE AND CALCIUM CHLORIDE: 600; 310; 30; 20 INJECTION, SOLUTION INTRAVENOUS at 03:01

## 2023-01-10 RX ADMIN — ENOXAPARIN SODIUM 40 MG: 100 INJECTION SUBCUTANEOUS at 05:01

## 2023-01-10 RX ADMIN — PROGESTERONE 100 MG: 100 CAPSULE ORAL at 11:01

## 2023-01-10 NOTE — LETTER
"  January 12, 2023         [address] [address]  Phone: [phone]  Fax: [phone]       Patient: Maricel Sepulveda   YOB: 1960  Date of Visit: 01/12/2023    To Whom It May Concern:    "Jenny Sepulveda  was at [facility] on 01/09/2023. The patient may return to work/school on 01/17/2023 {With/no:[id number]} restrictions. If you have any questions or concerns, or if I can be of further assistance, please do not hesitate to contact me.        Sincerely,    Ghazala Johnson RN     "

## 2023-01-10 NOTE — H&P
[facility]  Pulmonary Critical Care Note    Patient Name: Maricel Sepulveda  MRN: [id number]  Admission Date: 1/10/2023  Hospital Length of Stay: 0 days  Code Status: No Order  Attending Provider: Seven Momin MD  Primary Care Provider: Primary Doctor No     Subjective:     HPI:   Patient is a 62 yof with HTN, hypothyroidism who presents to [facility] ED with complaints of generalized weakness and fatigue with associated nausea but no vomiting for the past 48 hours.  She initially presented to urgent Care with these complaints, was found to be profoundly hypotensive and was redirected to the emergency department.  She states that approximately 2 days ago she began to feel weak and nauseated, denies recent sick contacts, only new medication is Bactrim which was prescribed for paronychia on 23.  She denies fevers, reports intermittent chills, also reports intermittent dyspnea on exertion but denies chest pain.  Denies sick contacts or recent travel.   with whom she lives has no similar symptoms or complaints.  Reports strict medication compliance up until today .    Upon arrival to ED, noted to be hypotensive with BP 80/60, HR in 90s.  Labs including CBC, CMP, troponin, BNP, CK unremarkable.  Lactic acid elevated at 4.7.  Was started on 30cc/kf IVF with NS and blood cultures drawn.         Past Medical History:   Diagnosis Date    Hypertension     Hypothyroidism        Past Surgical History:   Procedure Laterality Date     SECTION      TUBAL LIGATION      WRIST SURGERY Left        Social History     Socioeconomic History    Marital status:    Tobacco Use    Smoking status: Never    Smokeless tobacco: Never   Substance and Sexual Activity    Alcohol use: Not Currently    Drug use: Never           Current Outpatient Medications   Medication Instructions    amLODIPine (NORVASC) 5 mg, Oral, Daily    diclofenac sodium (VOLTAREN) 2 g, Topical, 4 times daily    estradiol 0.025  mg/24 hr 0.025 mg/24 hr 1 patch, Transdermal, Every 7 days    EUTHYROX 75 mcg, Oral, Every morning    fluticasone propionate (FLONASE) 50 mcg/actuation nasal spray 1 spray, Each Nostril, 2 times daily    hydroCHLOROthiazide (HYDRODIURIL) 25 mg, Oral, Daily    hydrOXYzine pamoate (VISTARIL) 50 mg, Oral, Every 8 hours PRN    loratadine (CLARITIN) 10 mg tablet 1 tablet, Oral, Daily    meloxicam (MOBIC) 7.5 mg, Oral, Daily PRN    paroxetine (PAXIL) 10 mg, Oral    progesterone (PROMETRIUM) 100 mg, Oral, Nightly    sulfamethoxazole-trimethoprim 800-160mg (BACTRIM DS) 800-160 mg Tab 1 tablet, Oral, 2 times daily    triamcinolone acetonide 0.1% (KENALOG) 0.1 % ointment Apply to affected area 2 times daily for up to 14 days    venlafaxine (EFFEXOR-XR) 75 mg, Oral       Current Inpatient Medications      Current Intravenous Infusions        Review of Systems   Constitutional:  Positive for chills and malaise/fatigue. Negative for fever and weight loss.   Respiratory:  Positive for shortness of breath. Negative for cough, hemoptysis and sputum production.    Cardiovascular:  Negative for chest pain, orthopnea and leg swelling.   Gastrointestinal:  Positive for nausea. Negative for constipation, diarrhea and vomiting.   Genitourinary:  Negative for dysuria and urgency.   Skin:  Negative for rash.   Neurological:  Positive for weakness. Negative for dizziness, focal weakness and headaches.        Objective:     No intake or output data in the 24 hours ending 01/10/23 1334      Vital Signs (Most Recent):  Temp: 97.9 °F (36.6 °C) (01/10/23 1037)  Pulse: 79 (01/10/23 1046)  Resp: (!) 22 (01/10/23 1037)  BP: 124/67 (01/10/23 1045)  SpO2: 98 % (01/10/23 1046)    Body mass index is 31.62 kg/m².  Weight: 86.2 kg (190 lb) Vital Signs (24h Range):  Temp:  [97.9 °F (36.6 °C)-98.5 °F (36.9 °C)] 97.9 °F (36.6 °C)  Pulse:  [73-96] 79  Resp:  [22-24] 22  SpO2:  [96 %-100 %] 98 %  BP: ()/(23-67) 124/67     Physical Exam  Constitutional:        General: She is not in acute distress.     Appearance: She is obese. She is ill-appearing. She is not diaphoretic.   HENT:      Head: Normocephalic and atraumatic.   Cardiovascular:      Rate and Rhythm: Normal rate and regular rhythm.      Heart sounds: Normal heart sounds. No murmur heard.  Pulmonary:      Effort: Pulmonary effort is normal. No respiratory distress.      Breath sounds: Normal breath sounds. No wheezing.   Abdominal:      General: Abdomen is flat.      Palpations: Abdomen is soft. There is no mass.      Tenderness: There is no abdominal tenderness. There is no guarding.   Skin:     Comments: Crusting at the base of the left thumbnail    Neurological:      General: No focal deficit present.      Mental Status: She is alert and oriented to person, place, and time. Mental status is at baseline.   Psychiatric:         Mood and Affect: Mood normal.         Behavior: Behavior normal.         Thought Content: Thought content normal.         Judgment: Judgment normal.         Lines/Drains/Airways       Peripheral Intravenous Line  Duration                  Peripheral IV - Single Lumen 01/10/23 1047 18 G Left Antecubital <1 day                    Significant Labs:    Lab Results   Component Value Date    WBC 5.2 01/10/2023    HGB 14.2 01/10/2023    HCT 42.8 01/10/2023    MCV 85.3 01/10/2023     01/10/2023         BMP  Lab Results   Component Value Date     (L) 01/10/2023    K 3.6 01/10/2023    CO2 25 01/10/2023    BUN 12.3 01/10/2023    CREATININE 1.19 (H) 01/10/2023    CALCIUM 9.9 01/10/2023     Imaging:  CT PE Pending       Assessment/Plan:   Generalized weakness and fatigue with profound hypotension  Lactic acidosis 2/2 above  -responded well to IVF 30cc/kg, SBP in 120's on exam, MAPS in 70's  -continue IVF with LR at 125ml/hr  -initial lactic 4.7, repeat lactic 2.0  -blood cultures x 2 pending, does not appear overtly septic, more likely viral etiology, will hold Abx for now and  continue supportive care    MATHUR  -CXR unremarkable, oxygenating well on room air, no respiratory distress  -CT PE ordered by ED pending  -echo pending     Hypothyroidism  -pending TSH, FT4  -continue levothyroxine 75mg daily    Hypertension  -hold amlodipine    DVT Prophylaxis: Lovenox  GI Prophylaxis: Famotidine    Disposition: continue supportive care for acute illness with associated hypotension and lactic acidosis, if responds well to supportive care, likely d/c to home tomorrow     David Reyes, DO  [employer] IM PGY 3

## 2023-01-10 NOTE — PLAN OF CARE
01/10/23 1420   Discharge Assessment   Assessment Type Discharge Planning Assessment   Confirmed/corrected address, phone number and insurance Yes   Confirmed Demographics Correct on Facesheet   Source of Information family   Reason For Admission SOB   People in Home spouse   Facility Arrived From: Urgent Care Clinic   Do you expect to return to your current living situation? Yes   Do you have help at home or someone to help you manage your care at home? Yes   Who are your caregiver(s) and their phone number(s)? Jose eSpulveda (Spouse)   [phone]   Prior to hospitilization cognitive status: Alert/Oriented   Current cognitive status: Alert/Oriented   Equipment Currently Used at Home none   Patient currently being followed by outpatient case management? No   Do you currently have service(s) that help you manage your care at home? No   Do you take prescription medications? Yes  ([facility])   Do you have prescription coverage? Yes   Coverage [employer]   Do you have any problems affording any of your prescribed medications? No   Is the patient taking medications as prescribed? yes   Who is going to help you get home at discharge? Drove Self   How do you get to doctors appointments? car, drives self   Are you on dialysis? No   Discharge Plan A Home with family   DME Needed Upon Discharge  none   Discharge Plan discussed with: Spouse/sig other   Name(s) and Number(s) Jose Sepulveda (Spouse)   [phone]   Discharge Barriers Identified None   Physical Activity   On average, how many days per week do you engage in moderate to strenuous exercise (like a brisk walk)? 0 days   On average, how many minutes do you engage in exercise at this level? 0 min   Financial Resource Strain   How hard is it for you to pay for the very basics like food, housing, medical care, and heating? Not hard   Housing Stability   In the last 12 months, was there a time when you were not able to pay the mortgage or  rent on time? N   In the last 12 months, how many places have you lived? 1   In the last 12 months, was there a time when you did not have a steady place to sleep or slept in a shelter (including now)? N   Transportation Needs   In the past 12 months, has lack of transportation kept you from medical appointments or from getting medications? no   In the past 12 months, has lack of transportation kept you from meetings, work, or from getting things needed for daily living? No   Food Insecurity   Within the past 12 months, you worried that your food would run out before you got the money to buy more. Never true   Within the past 12 months, the food you bought just didn't last and you didn't have money to get more. Never true   Stress   Do you feel stress - tense, restless, nervous, or anxious, or unable to sleep at night because your mind is troubled all the time - these days? Not at all   Social Connections   In a typical week, how many times do you talk on the phone with family, friends, or neighbors? More than 3   How often do you get together with friends or relatives? More than 3   How often do you attend Yarsani or Shinto services? 1 to 4  (Yazidi)   How often do you attend meetings of the clubs or organizations you belong to? Never   Are you , , , , never , or living with a partner?    Alcohol Use   Q1: How often do you have a drink containing alcohol? Never   Q2: How many drinks containing alcohol do you have on a typical day when you are drinking? None   Q3: How often do you have six or more drinks on one occasion? Never   OTHER   Name(s) of People in Home Jose Sepulveda (Spouse)   [phone]     Hx obtained from spouse, Jose Sepulveda, who identified himself as a disabled . Spouse reported pt works full time at the [employer] and is completely independent with self-care. No needs identified at this time. Will follow for d/c planning.

## 2023-01-10 NOTE — PROGRESS NOTES
"Subjective:       Patient ID: Maricel Sepulveda is a 62 y.o. female.    Vitals:  height is 5' 5" (1.651 m) and weight is 83.5 kg (184 lb). Her temperature is 98.5 °F (36.9 °C). Her blood pressure is 42/23 (abnormal) and her pulse is 78. Her respiration is 24 (abnormal) and oxygen saturation is 99%.     Chief Complaint: Nausea (Nausea, weakness, loss of appetite)    PT states nausea, weakness, no appetite since yesterday.States feels like she is going to pass out.    Nausea  Associated symptoms include nausea.     Constitution: Positive for generalized weakness.   HENT: Negative.     Neck: neck negative.   Cardiovascular: Negative.    Respiratory: Negative.     Gastrointestinal:  Positive for nausea.   Genitourinary: Negative.    Musculoskeletal: Negative.    Neurological: Negative.    Hematologic/Lymphatic: Negative.      Objective:      Physical Exam   Constitutional: She is oriented to person, place, and time. She appears ill.   Cardiovascular: Normal rate and normal pulses.   Pulmonary/Chest: Effort normal.   Neurological: She is oriented to person, place, and time.   Skin: Skin is warm and dry.   Vitals reviewed.      Assessment:       1. Weakness    2. Hypotension, unspecified hypotension type          Plan:         Weakness    Hypotension, unspecified hypotension type    Pt quickly evaluated and sent to the ED via wheelchair for further evaluation and treatment.                  "

## 2023-01-10 NOTE — ED PROVIDER NOTES
Encounter Date: 1/10/2023       History     Chief Complaint   Patient presents with    Fatigue     PT SENT FROM  FOR HYPOTENSION, PT W CO WEAKNESS, NAUSEA,SOB, SUBSTERNAL CP SINCE YESTERDAY.  PT BP 80/49 IN TRIAGE.  TO [facility] FOR FURTHER EVAL  EKG OBTAINED.      Weakness, dyspnea, chest tightness, diaphoresis, with exertion since Monday. Went to urgent care this morning and found considerably hypotensive. Arrives in the ED bp improved, still endorsing ongoing intermittent symptom complex this week.      Chest Pain  The current episode started several days ago. Chest pain occurs intermittently. The chest pain is unchanged. At its most intense, the chest pain is at 2/10. The chest pain is currently at 2/10. The quality of the pain is described as dull. The pain does not radiate. Primary symptoms include syncope and nausea. Pertinent negatives for primary symptoms include no fever, no fatigue, no shortness of breath, no cough, no wheezing, no palpitations, no abdominal pain and no vomiting.   The syncopal episode began 2 days ago. There was no loss of consciousness. The duration of the episode was 48 hours. Before the onset of the syncopal episode there was nausea. The syncopal episode did not occur with palpitations or shortness of breath.   Her past medical history is significant for diabetes.   Pertinent negatives for past medical history include no aneurysm, no anxiety/panic attacks, no arrhythmia and no CAD.   Her family medical history is significant for CAD.   Review of patient's allergies indicates:  No Known Allergies  Past Medical History:   Diagnosis Date    Hypertension     Hypothyroidism      Past Surgical History:   Procedure Laterality Date     SECTION      TUBAL LIGATION      WRIST SURGERY Left      History reviewed. No pertinent family history.  Social History     Tobacco Use    Smoking status: Never    Smokeless tobacco: Never   Substance Use Topics    Alcohol use: Not Currently    Drug use:  Never     Review of Systems   Constitutional:  Negative for fatigue and fever.   Respiratory:  Negative for cough, shortness of breath and wheezing.    Cardiovascular:  Positive for chest pain and syncope. Negative for palpitations.   Gastrointestinal:  Positive for nausea. Negative for abdominal pain and vomiting.   Neurological:  Negative for loss of consciousness.   All other systems reviewed and are negative.    Physical Exam     Initial Vitals [01/10/23 1037]   BP Pulse Resp Temp SpO2   (!) 80/49 96 (!) 22 97.9 °F (36.6 °C) 96 %      MAP       --         Physical Exam    Nursing note and vitals reviewed.  Constitutional: She appears well-developed and well-nourished. She is not diaphoretic. No distress.   HENT:   Head: Normocephalic and atraumatic.   Right Ear: External ear normal.   Left Ear: External ear normal.   Eyes: EOM are normal. Pupils are equal, round, and reactive to light. Right eye exhibits no discharge. Left eye exhibits no discharge.   Neck: Neck supple. No thyromegaly present. No tracheal deviation present.   Normal range of motion.  Cardiovascular:  Normal rate, regular rhythm, normal heart sounds and intact distal pulses.     Exam reveals no gallop and no friction rub.       No murmur heard.  Pulmonary/Chest: Breath sounds normal. No stridor. No respiratory distress. She has no wheezes. She has no rhonchi. She has no rales.   Abdominal: Abdomen is soft. Bowel sounds are normal. She exhibits no distension. There is no abdominal tenderness. There is no rebound.   Musculoskeletal:         General: No tenderness or edema. Normal range of motion.      Cervical back: Normal range of motion and neck supple.     Neurological: She is alert and oriented to person, place, and time. She has normal strength. No cranial nerve deficit or sensory deficit. GCS score is 15. GCS eye subscore is 4. GCS verbal subscore is 5. GCS motor subscore is 6.   Skin: Skin is warm and dry. Capillary refill takes less than 2  seconds. No rash and no abscess noted. No erythema. No pallor.   Psychiatric: She has a normal mood and affect. Her behavior is normal. Judgment and thought content normal.       ED Course   Procedures  Labs Reviewed   COMPREHENSIVE METABOLIC PANEL - Abnormal; Notable for the following components:       Result Value    Sodium Level 135 (*)     Glucose Level 132 (*)     Creatinine 1.19 (*)     Protein Total 8.5 (*)     Globulin 4.4 (*)     Albumin/Globulin Ratio 0.9 (*)     All other components within normal limits   LACTIC ACID, PLASMA - Abnormal; Notable for the following components:    Lactic Acid Level 4.7 (*)     All other components within normal limits   LIPID PANEL - Abnormal; Notable for the following components:    Cholesterol Total 207 (*)     All other components within normal limits   CBC WITH DIFFERENTIAL - Abnormal; Notable for the following components:    MPV 11.0 (*)     Lymph # 0.42 (*)     All other components within normal limits   POCT GLUCOSE - Abnormal; Notable for the following components:    POCT Glucose 121 (*)     All other components within normal limits   TROPONIN I - Normal   CK - Normal   CK-MB - Normal   B-TYPE NATRIURETIC PEPTIDE - Normal   LACTIC ACID, PLASMA - Normal   BLOOD CULTURE OLG   BLOOD CULTURE OLG   CBC W/ AUTO DIFFERENTIAL    Narrative:     The following orders were created for panel order CBC auto differential.  Procedure                               Abnormality         Status                     ---------                               -----------         ------                     CBC with Differential[[id number]]        Abnormal            Final result                 Please view results for these tests on the individual orders.   HEMOGLOBIN A1C   URINALYSIS, REFLEX TO URINE CULTURE   DRUG SCREEN, URINE (BEAKER)   EXTRA TUBES    Narrative:     The following orders were created for panel order EXTRA TUBES.  Procedure                               Abnormality          Status                     ---------                               -----------         ------                     Light Blue Top Hold[[id number]]                              In process                 Red Top Hold[[id number]]                                     In process                 Pink Top Hold[[id number]]                                    In process                   Please view results for these tests on the individual orders.   LIGHT BLUE TOP HOLD   RED TOP HOLD   PINK TOP HOLD   COVID/FLU A&B PCR   TSH   T4, FREE   EXTRA TUBES    Narrative:     The following orders were created for panel order EXTRA TUBES.  Procedure                               Abnormality         Status                     ---------                               -----------         ------                     Light Green Top Hold[[id number]]                             In process                 Lavender Top Hold[[id number]]                                In process                   Please view results for these tests on the individual orders.   LIGHT GREEN TOP HOLD   LAVENDER TOP HOLD     EKG Readings: (Independently Interpreted)   NSR 76, non acute and non ischemic ;   ECG Results              EKG 12-lead (Shortness of Breath) Age > 50 (Final result)  Result time 01/10/23 14:11:39      Final result by Interface, Lab In [facility] (01/10/23 14:11:39)                   Narrative:    Test Reason : R06.02,    Vent. Rate : 076 BPM     Atrial Rate : 076 BPM     P-R Int : 150 ms          QRS Dur : 088 ms      QT Int : 364 ms       P-R-T Axes : 070 -40 047 degrees     QTc Int : 409 ms    Normal sinus rhythm  Right atrial enlargement  Left axis deviation  Nonspecific ST and T wave abnormality  Abnormal ECG  No previous ECGs available  Confirmed by Mahendra Singleton MD ([id number]) on 1/10/2023 2:11:29 PM    Referred By:             Confirmed By:Mahendra Singleton MD                                  Imaging Results              CTA Chest Non-Coronary (PE  Studies) (In process)                      X-Ray Chest AP Portable (Final result)  Result time 01/10/23 12:01:41      Final result by Shania Romero MD (01/10/23 12:01:41)                   Impression:      No acute abnormality of the chest.      Electronically signed by: Shania Romero  Date:    01/10/2023  Time:    12:01               Narrative:    EXAMINATION:  XR CHEST AP PORTABLE    CLINICAL HISTORY:  Weakness    COMPARISON:  Chest x-ray dated 07/25/2012    FINDINGS:  The heart is normal in size.  The lungs are clear without focal consolidation.  There is no pleural effusion or visible pneumothorax.                                    X-Rays:   Independently Interpreted Readings:   Other Readings:  Cxr unremarkable  Medications   sodium chloride 0.9% flush 10 mL (has no administration in time range)   melatonin tablet 6 mg (has no administration in time range)   enoxaparin injection 40 mg (has no administration in time range)   lactated ringers infusion (has no administration in time range)   levothyroxine tablet 75 mcg (has no administration in time range)   progesterone capsule 100 mg (has no administration in time range)   paroxetine tablet 10 mg (10 mg Oral Given 1/10/23 1434)   venlafaxine 24 hr capsule 75 mg (75 mg Oral Given 1/10/23 1434)   famotidine tablet 20 mg (20 mg Oral Given 1/10/23 1434)   iohexoL (OMNIPAQUE 350) injection 100 mL (has no administration in time range)   sodium chloride 0.9% bolus 2,586 mL 2,586 mL (0 mLs Intravenous Stopped 1/10/23 1215)   aspirin tablet 325 mg (325 mg Oral Given 1/10/23 1109)   iohexoL (OMNIPAQUE 350) 350 mg iodine/mL injection (100 mLs Intravenous Given 1/10/23 1415)     Medical Decision Making:   History:   Old Medical Records: I decided to obtain old medical records.  Initial Assessment:   Presents with possible anginal symptom; arrives hypotensive  Differential Diagnosis:   Tia/cva, acs/nstemi/unstable angina, pneumonia, ptx, pna  Clinical Tests:   Lab  Tests: Ordered and Reviewed  Radiological Study: Ordered and Reviewed  Medical Tests: Ordered and Reviewed  ED Management:  Patient presents hypotensive with exertional symptoms concerning for an anginal equivalent. Risk found sufficient to warrant expanded evaluation with objective data. The objective data include an elevated lactate. Troponin is negative, EKG is reassuring.   Other:   I have discussed this case with another health care provider.       <> Summary of the Discussion: Internal medicine aware and agrees plan admit for further evaluation / definitive mgt.           ED Course as of 01/10/23 1436   Tue Diomedes 10, 2023   1253 Lactate modestly elevated ; [CT]   1253 Negative troponin ; [CT]   1253 Reassuring chemistries ; [CT]      ED Course User Index  [CT] Seven Momin MD                 Clinical Impression:   Final diagnoses:  [R06.02] SOB (shortness of breath)  [R53.1] Weakness  [R06.00] Dyspnea        ED Disposition Condition    Observation                 Seven Momin MD  01/10/23 1308       Seven Momin MD  01/10/23 1436

## 2023-01-11 VITALS
RESPIRATION RATE: 20 BRPM | DIASTOLIC BLOOD PRESSURE: 66 MMHG | HEIGHT: 65 IN | BODY MASS INDEX: 31.65 KG/M2 | WEIGHT: 190 LBS | HEART RATE: 55 BPM | TEMPERATURE: 99 F | OXYGEN SATURATION: 97 % | SYSTOLIC BLOOD PRESSURE: 109 MMHG

## 2023-01-11 PROBLEM — E86.1 HYPOTENSION DUE TO HYPOVOLEMIA: Status: ACTIVE | Noted: 2023-01-11

## 2023-01-11 LAB
ALBUMIN SERPL-MCNC: 3 G/DL (ref 3.4–4.8)
ALBUMIN/GLOB SERPL: 1 RATIO (ref 1.1–2)
ALP SERPL-CCNC: 64 UNIT/L (ref 40–150)
ALT SERPL-CCNC: 11 UNIT/L (ref 0–55)
ANISOCYTOSIS BLD QL SMEAR: ABNORMAL
AST SERPL-CCNC: 23 UNIT/L (ref 5–34)
BASOPHILS # BLD AUTO: 0.02 X10(3)/MCL (ref 0–0.2)
BASOPHILS NFR BLD AUTO: 0.7 %
BILIRUBIN DIRECT+TOT PNL SERPL-MCNC: 0.3 MG/DL
BUN SERPL-MCNC: 8 MG/DL (ref 9.8–20.1)
CALCIUM SERPL-MCNC: 8.1 MG/DL (ref 8.4–10.2)
CHLORIDE SERPL-SCNC: 107 MMOL/L (ref 98–107)
CO2 SERPL-SCNC: 24 MMOL/L (ref 23–31)
CREAT SERPL-MCNC: 0.69 MG/DL (ref 0.55–1.02)
EOSINOPHIL # BLD AUTO: 0.04 X10(3)/MCL (ref 0–0.9)
EOSINOPHIL NFR BLD AUTO: 1.4 %
ERYTHROCYTE [DISTWIDTH] IN BLOOD BY AUTOMATED COUNT: 14 % (ref 11.5–17)
GFR SERPLBLD CREATININE-BSD FMLA CKD-EPI: >60 MLS/MIN/1.73/M2
GLOBULIN SER-MCNC: 3 GM/DL (ref 2.4–3.5)
GLUCOSE SERPL-MCNC: 86 MG/DL (ref 82–115)
HCT VFR BLD AUTO: 35.4 % (ref 37–47)
HGB BLD-MCNC: 11.8 GM/DL (ref 12–16)
IMM GRANULOCYTES # BLD AUTO: 0.01 X10(3)/MCL (ref 0–0.04)
IMM GRANULOCYTES NFR BLD AUTO: 0.4 %
LYMPHOCYTES # BLD AUTO: 1.2 X10(3)/MCL (ref 0.6–4.6)
LYMPHOCYTES NFR BLD AUTO: 43.5 %
MCH RBC QN AUTO: 28.6 PG
MCHC RBC AUTO-ENTMCNC: 33.3 MG/DL (ref 33–36)
MCV RBC AUTO: 85.7 FL (ref 80–94)
MONOCYTES # BLD AUTO: 0.5 X10(3)/MCL (ref 0.1–1.3)
MONOCYTES NFR BLD AUTO: 18.1 %
NEUTROPHILS # BLD AUTO: 0.99 X10(3)/MCL (ref 2.1–9.2)
NEUTROPHILS NFR BLD AUTO: 35.9 %
NRBC BLD AUTO-RTO: 0 %
PLATELET # BLD AUTO: 134 X10(3)/MCL (ref 130–400)
PLATELET # BLD EST: ADEQUATE 10*3/UL
PLATELETS.RETICULATED NFR BLD AUTO: 4.3 % (ref 0.9–11.2)
PMV BLD AUTO: 11 FL (ref 7.4–10.4)
POTASSIUM SERPL-SCNC: 3.6 MMOL/L (ref 3.5–5.1)
PROT SERPL-MCNC: 6 GM/DL (ref 5.8–7.6)
RBC # BLD AUTO: 4.13 X10(6)/MCL (ref 4.2–5.4)
RBC MORPH BLD: ABNORMAL
SODIUM SERPL-SCNC: 139 MMOL/L (ref 136–145)
WBC # SPEC AUTO: 2.8 X10(3)/MCL (ref 4.5–11.5)

## 2023-01-11 PROCEDURE — 36415 COLL VENOUS BLD VENIPUNCTURE: CPT | Performed by: STUDENT IN AN ORGANIZED HEALTH CARE EDUCATION/TRAINING PROGRAM

## 2023-01-11 PROCEDURE — 25000003 PHARM REV CODE 250: Performed by: STUDENT IN AN ORGANIZED HEALTH CARE EDUCATION/TRAINING PROGRAM

## 2023-01-11 PROCEDURE — 80053 COMPREHEN METABOLIC PANEL: CPT | Performed by: STUDENT IN AN ORGANIZED HEALTH CARE EDUCATION/TRAINING PROGRAM

## 2023-01-11 PROCEDURE — 63600175 PHARM REV CODE 636 W HCPCS: Performed by: STUDENT IN AN ORGANIZED HEALTH CARE EDUCATION/TRAINING PROGRAM

## 2023-01-11 PROCEDURE — 85025 COMPLETE CBC W/AUTO DIFF WBC: CPT | Performed by: STUDENT IN AN ORGANIZED HEALTH CARE EDUCATION/TRAINING PROGRAM

## 2023-01-11 PROCEDURE — 96361 HYDRATE IV INFUSION ADD-ON: CPT

## 2023-01-11 PROCEDURE — G0378 HOSPITAL OBSERVATION PER HR: HCPCS

## 2023-01-11 PROCEDURE — 94761 N-INVAS EAR/PLS OXIMETRY MLT: CPT

## 2023-01-11 RX ORDER — ACETAMINOPHEN 325 MG/1
650 TABLET ORAL EVERY 6 HOURS PRN
Status: DISCONTINUED | OUTPATIENT
Start: 2023-01-11 | End: 2023-01-11 | Stop reason: HOSPADM

## 2023-01-11 RX ADMIN — LEVOTHYROXINE SODIUM 75 MCG: 0.07 TABLET ORAL at 06:01

## 2023-01-11 RX ADMIN — SODIUM CHLORIDE, POTASSIUM CHLORIDE, SODIUM LACTATE AND CALCIUM CHLORIDE: 600; 310; 30; 20 INJECTION, SOLUTION INTRAVENOUS at 10:01

## 2023-01-11 RX ADMIN — SODIUM CHLORIDE, POTASSIUM CHLORIDE, SODIUM LACTATE AND CALCIUM CHLORIDE: 600; 310; 30; 20 INJECTION, SOLUTION INTRAVENOUS at 03:01

## 2023-01-11 RX ADMIN — ACETAMINOPHEN 650 MG: 325 TABLET, FILM COATED ORAL at 10:01

## 2023-01-11 RX ADMIN — FAMOTIDINE 20 MG: 20 TABLET, FILM COATED ORAL at 09:01

## 2023-01-11 NOTE — DISCHARGE SUMMARY
[facility] Discharge Summary    Admitting Physician: Avila Carlton MD  Attending Physician: Avila Carlton MD  Date of Admit: 1/10/2023  Date of Discharge: 1/11/2023    Discharge to: Home or Self Care Home  Condition: Good    Discharge Diagnoses     Patient Active Problem List   Diagnosis    Lactic acidosis    Weakness generalized    Dyspnea on exertion    Hypotension due to hypovolemia       Consultants and Procedures     Consultants:   None    Procedures:   None    Brief History of Present Illness      Patient is a 62 yof with HTN, hypothyroidism who presents to [facility] ED with complaints of generalized weakness and fatigue with associated nausea but no vomiting for the past 48 hours.  She initially presented to urgent Care with these complaints, was found to be profoundly hypotensive and was redirected to the emergency department.  She states that approximately 2 days ago she began to feel weak and nauseated, denies recent sick contacts, only new medication is Bactrim which was prescribed for paronychia on 1/5/23.  She denies fevers, reports intermittent chills, also reports intermittent dyspnea on exertion but denies chest pain.  Denies sick contacts or recent travel.   with whom she lives has no similar symptoms or complaints.  Reports strict medication compliance up until today .     Upon arrival to ED, noted to be hypotensive with BP 80/60, HR in 90s.  Labs including CBC, CMP, troponin, BNP, CK unremarkable.  Lactic acid elevated at 4.7.  Was started on 30cc/kf IVF with NS and blood cultures drawn.          Hospital Course with Pertinent Findings     Hospital medicine consulted for generalized weakness with hypotension and lactic acidosis.  Both patient's hypotension and lactic acid significantly improved with aggressive volume resuscitation on 2.5L NS.  She was continued on IVF at 125 ml/hr throughout the evening and night and was actually hypertensive on am of discharge.  CT PE protocol in the  ED showed no evidence of pulmonary embolism, but did note emphysematous changes (she is a previous smoker, quit >20 years ago).  Echocardiogram on 1/10/23 was unremarkable with EF 65% and no significant valvular abnormalities.  She reports significant improvement in generalized symptoms this am and feels closer to her baseline.  She does report that she was strictly compliant with all medications, including anti-hypertensives while feeling ill with decreased oral intake.  Explained that her acute presentation was likely related to hypotension from both acute illness with poor PO intake as well as combined effect of blood pressure medications.        Discharge physical exam:  Vitals:    01/11/23 0745   BP: (!) 150/76   Pulse: 61   Resp: 20   Temp: 98.2 °F (36.8 °C)     Constitutional:       General: She is not in acute distress.     Appearance: She is not diaphoretic.   HENT:      Head: Normocephalic and atraumatic.   Cardiovascular:      Rate and Rhythm: Normal rate and regular rhythm.      Heart sounds: Normal heart sounds. No murmur heard.  Pulmonary:      Effort: Pulmonary effort is normal. No respiratory distress.      Breath sounds: Normal breath sounds. No wheezing.   Abdominal:      General: Abdomen is flat.      Palpations: Abdomen is soft. There is no mass.      Tenderness: There is no abdominal tenderness. There is no guarding.   Skin:     Comments: Crusting at the base of the left thumbnail    Neurological:      General: No focal deficit present.      Mental Status: She is alert and oriented to person, place, and time. Mental status is at baseline.   Psychiatric:         Mood and Affect: Mood normal.         Behavior: Behavior normal.         Thought Content: Thought content normal.         Judgment: Judgment normal.       TIME SPENT ON DISCHARGE: 60 minutes    Discharge Medications        Medication List        CONTINUE taking these medications      amLODIPine 5 MG tablet  Commonly known as: NORVASC      diclofenac sodium 1 % Gel  Commonly known as: VOLTAREN     estradiol 0.025 mg/24 hr 0.025 mg/24 hr     EUTHYROX 75 MCG tablet  Generic drug: levothyroxine     hydroCHLOROthiazide 25 MG tablet  Commonly known as: HYDRODIURIL     hydrOXYzine pamoate 25 MG Cap  Commonly known as: VISTARIL     loratadine 10 mg tablet  Commonly known as: CLARITIN     meloxicam 7.5 MG tablet  Commonly known as: MOBIC     progesterone 100 MG capsule  Commonly known as: PROMETRIUM            STOP taking these medications      fluticasone propionate 50 mcg/actuation nasal spray  Commonly known as: FLONASE     paroxetine 10 MG tablet  Commonly known as: PAXIL     sulfamethoxazole-trimethoprim 800-160mg 800-160 mg Tab  Commonly known as: BACTRIM DS     triamcinolone acetonide 0.1% 0.1 % ointment  Commonly known as: KENALOG     venlafaxine 75 MG 24 hr capsule  Commonly known as: EFFEXOR-XR              Discharge Information:   Discharged in stable condition to home  Instructed patient to hold amlodipine and HCTZ during acute illness to prevent recurrent hypotension  Follow up with PCP Dr. Bush within 1-2 weeks     David Reyes DO PGY3  [facility]

## 2023-01-13 LAB
BACTERIA BLD CULT: ABNORMAL
GRAM STN SPEC: ABNORMAL

## 2023-01-15 LAB — BACTERIA BLD CULT: NORMAL

## 2023-01-31 ENCOUNTER — OFFICE VISIT (OUTPATIENT)
Dept: FAMILY MEDICINE | Facility: CLINIC | Age: 63
End: 2023-01-31
Payer: COMMERCIAL

## 2023-01-31 VITALS
HEIGHT: 65 IN | TEMPERATURE: 99 F | OXYGEN SATURATION: 100 % | DIASTOLIC BLOOD PRESSURE: 52 MMHG | BODY MASS INDEX: 31.85 KG/M2 | WEIGHT: 191.19 LBS | HEART RATE: 60 BPM | SYSTOLIC BLOOD PRESSURE: 129 MMHG | RESPIRATION RATE: 18 BRPM

## 2023-01-31 DIAGNOSIS — L03.012: Primary | ICD-10-CM

## 2023-01-31 DIAGNOSIS — L30.9: ICD-10-CM

## 2023-01-31 DIAGNOSIS — L62: ICD-10-CM

## 2023-01-31 DIAGNOSIS — L03.011 CHRONIC PARONYCHIA OF RIGHT THUMB: ICD-10-CM

## 2023-01-31 PROCEDURE — 99214 OFFICE O/P EST MOD 30 MIN: CPT | Mod: PBBFAC

## 2023-01-31 RX ORDER — BETAMETHASONE DIPROPIONATE 0.5 MG/G
CREAM TOPICAL 2 TIMES DAILY
Qty: 15 G | Refills: 1 | Status: SHIPPED | OUTPATIENT
Start: 2023-01-31 | End: 2023-03-16 | Stop reason: SDUPTHER

## 2023-01-31 NOTE — PROGRESS NOTES
[facility] Note    Subjective:     Patient ID: Maricel Sepulveda is a 62 y.o. female    Chief Complaint:   Chief Complaint   Patient presents with    thumb nails       HPI  61 yo F presents for evaluation of lesion on finger    Acute Concerns:  Patient reports that she she has had this deformity of her bilateral thumbs. Deformity has been present for years, recalls having issue about 5yrs ago, but nails cleared up. This current episode has been presents for a while. She reports bilateral thumbs swollen, red, and tender a few weeks ago, [place] [facility] [place] diagnosed with a chronic paronychia and treated with bactrim for the acute flare. Today patient interested in treatment for the nail deformity. She denies any pain at this current time but does report having pain in the are at times. Patient has Hx of eczema as well so may be a component of the nail problem.      Review of Systems  As per HPI    Objective:     Vitals:    01/31/23 0908   BP: (!) 129/52   Pulse: 60   Resp: 18   Temp: 98.6 °F (37 °C)       Physical Exam    General: Well developed, no acute distress  Skin: warm, dry, nail deformities seen below          Assessment:     Problem List Items Addressed This Visit    None  Visit Diagnoses       Chronic paronychia of left thumb    -  Primary    Chronic paronychia of right thumb        Dystrophy of nail due to eczema                Plan:       Chronic Paronychia vs Dystrophic Eczema  - Betamethasone BID x3 weeks  - Discussed hand hygiene and keeping hands/nails dry  - May consider nail removal if not responding to topical steroid treatment    RTC in 6 weeks for reevaluation    Mercy Johns MD  [facility]  PGY-3

## 2023-03-16 ENCOUNTER — OFFICE VISIT (OUTPATIENT)
Dept: URGENT CARE | Facility: CLINIC | Age: 63
End: 2023-03-16
Payer: COMMERCIAL

## 2023-03-16 VITALS
HEART RATE: 52 BPM | RESPIRATION RATE: 20 BRPM | OXYGEN SATURATION: 98 % | WEIGHT: 191.81 LBS | TEMPERATURE: 98 F | BODY MASS INDEX: 30.82 KG/M2 | HEIGHT: 66 IN

## 2023-03-16 DIAGNOSIS — L30.9 ECZEMA, UNSPECIFIED TYPE: Primary | ICD-10-CM

## 2023-03-16 PROCEDURE — 99214 OFFICE O/P EST MOD 30 MIN: CPT | Mod: PBBFAC

## 2023-03-16 PROCEDURE — 99213 PR OFFICE/OUTPT VISIT, EST, LEVL III, 20-29 MIN: ICD-10-PCS | Mod: S$PBB,,,

## 2023-03-16 PROCEDURE — 99213 OFFICE O/P EST LOW 20 MIN: CPT | Mod: S$PBB,,,

## 2023-03-16 RX ORDER — PREDNISONE 20 MG/1
20 TABLET ORAL 2 TIMES DAILY
Qty: 10 TABLET | Refills: 0 | Status: SHIPPED | OUTPATIENT
Start: 2023-03-16 | End: 2023-03-21

## 2023-03-16 RX ORDER — BETAMETHASONE DIPROPIONATE 0.5 MG/G
CREAM TOPICAL 2 TIMES DAILY
Qty: 15 G | Refills: 1 | Status: SHIPPED | OUTPATIENT
Start: 2023-03-16 | End: 2023-04-04 | Stop reason: SDUPTHER

## 2023-03-16 RX ORDER — MONTELUKAST SODIUM 10 MG/1
1 TABLET ORAL NIGHTLY
COMMUNITY
Start: 2023-01-25 | End: 2023-04-04

## 2023-03-17 NOTE — PROGRESS NOTES
"Subjective:       Patient ID: Maricel Sepulveda is a 62 y.o. female.    Vitals:  height is 5' 5.75" (1.67 m) and weight is 87 kg (191 lb 12.8 oz). Her temperature is 97.9 °F (36.6 °C). Her pulse is 52 (abnormal). Her respiration is 20 and oxygen saturation is 98%.     Chief Complaint: Rash (Bilat hands x 14days)    PT states rash on her hands for the last 2 weeks. States hx of eczema.     Rash      Constitution: Negative.   HENT: Negative.     Neck: neck negative.   Cardiovascular: Negative.    Eyes: Negative.    Respiratory: Negative.     Gastrointestinal: Negative.    Genitourinary: Negative.    Musculoskeletal: Negative.    Skin:  Positive for rash.   Neurological: Negative.      Objective:      Physical Exam   Constitutional: She is oriented to person, place, and time. normal  HENT:   Head: Normocephalic.   Mouth/Throat: Mucous membranes are moist. Oropharynx is clear.   Eyes: Pupils are equal, round, and reactive to light.   Cardiovascular: Normal rate and normal pulses.   Pulmonary/Chest: Effort normal.   Abdominal: Normal appearance. Soft.   Musculoskeletal: Normal range of motion.         General: Normal range of motion.        Hands:    Neurological: She is alert and oriented to person, place, and time.   Skin: Skin is warm and rash.   Vitals reviewed.      Assessment:       1. Eczema, unspecified type            Plan:         Eczema, unspecified type  -     predniSONE (DELTASONE) 20 MG tablet; Take 1 tablet (20 mg total) by mouth 2 (two) times daily. for 5 days  Dispense: 10 tablet; Refill: 0  -     betamethasone dipropionate 0.05 % cream; Apply topically 2 (two) times daily.  Dispense: 15 g; Refill: 1    PT has a follow up with PCP next week. Keep appt as scheduled. Keep skin moist.      ER precautions given, patient verbalized understanding.     Please see provided patient education for guidance.    Follow up with PCP or return to clinic if symptoms worsen or do not improve.                     "

## 2023-04-04 ENCOUNTER — OFFICE VISIT (OUTPATIENT)
Dept: FAMILY MEDICINE | Facility: CLINIC | Age: 63
End: 2023-04-04
Payer: COMMERCIAL

## 2023-04-04 VITALS
TEMPERATURE: 99 F | RESPIRATION RATE: 18 BRPM | SYSTOLIC BLOOD PRESSURE: 125 MMHG | DIASTOLIC BLOOD PRESSURE: 79 MMHG | OXYGEN SATURATION: 100 % | HEART RATE: 68 BPM | WEIGHT: 189 LBS | BODY MASS INDEX: 30.37 KG/M2 | HEIGHT: 66 IN

## 2023-04-04 DIAGNOSIS — Z79.899 ENCOUNTER FOR MEDICATION REVIEW: ICD-10-CM

## 2023-04-04 DIAGNOSIS — L60.3 DYSTROPHIC NAIL: Primary | ICD-10-CM

## 2023-04-04 DIAGNOSIS — Z76.0 MEDICATION REFILL: ICD-10-CM

## 2023-04-04 DIAGNOSIS — L30.9 ECZEMA, UNSPECIFIED TYPE: ICD-10-CM

## 2023-04-04 PROBLEM — F41.9 ANXIETY: Status: ACTIVE | Noted: 2022-12-10

## 2023-04-04 PROBLEM — N95.1 VASOMOTOR SYMPTOMS DUE TO MENOPAUSE: Status: ACTIVE | Noted: 2022-12-10

## 2023-04-04 PROBLEM — M65.4 RADIAL STYLOID TENOSYNOVITIS OF RIGHT HAND: Status: ACTIVE | Noted: 2022-12-10

## 2023-04-04 PROCEDURE — 99214 OFFICE O/P EST MOD 30 MIN: CPT | Mod: PBBFAC

## 2023-04-04 RX ORDER — BETAMETHASONE DIPROPIONATE 0.5 MG/G
CREAM TOPICAL 2 TIMES DAILY
Qty: 15 G | Refills: 1 | Status: SHIPPED | OUTPATIENT
Start: 2023-04-04 | End: 2023-06-01 | Stop reason: SDUPTHER

## 2023-04-04 NOTE — PROGRESS NOTES
[facility] Clinic Note    Subjective:     Patient ID: Maricel Sepulveda is a 62 y.o. female    Chief Complaint:   No chief complaint on file.      HPI  61 yo F PMH atrophic dermatitis. Presents for 6w FU for  reevaluation of lesion on finger      1/2023 [facility]:   - deformity of her bilateral thumbnalls with peeling and thinning  - present for years 5yrs ago, but nails cleared up; flared 1/5/2023 after which referral for [facility] clinic was sent   - assoc bilateral thumbs swollen, red, and tender   - [facility]  diagnosed with a chronic paronychia ; bactrim for the acute flare for which pt reports she took as directed   - assoc pain       1/31/2023 [facility]:   - chronic paronychia vs dystrophic eczema   - betamethasone BID x3 weeks, pt reports using as directed with good relief  - good hand hygiene   - consider nail removal if not responding to topicals       3/16/2023 [facility]:   - severe atrophic dermatitis flare at BL hands   - Rx prednisone 20mg BID for 5d, betamethasone cream.     Previously seen by dermatologist but has been years.   May benefit from dermatology referral     Review of Systems  As per HPI    Objective:     Vitals:    04/04/23 1406   BP: 125/79   Pulse: 68   Resp: 18   Temp: 98.6 °F (37 °C)         Physical Exam    General: Well developed, no acute distress  Skin: warm, dry, nail deformities similar to previous, no rash on hands       Assessment:     Problem List Items Addressed This Visit    None  Visit Diagnoses       Dystrophic nail    -  Primary    Eczema, unspecified type                  Plan:       Dystrophic Eczema  Medication review   Medication refill     Refilled betamethasone  Continue to avoid hot water, scented soaps  Moisturize with petroleum jelly, at least once immediately after bathing  Betamethasone only for flares; discussed dangers, pt expressed understanding   Atopic dermatitis info dist  Amb ref to [facility] Derm placed     RTC PRN     MARYURI Valles MD  [facility]  \[facility]\"" Family Medicine

## 2023-06-01 ENCOUNTER — OFFICE VISIT (OUTPATIENT)
Dept: FAMILY MEDICINE | Facility: CLINIC | Age: 63
End: 2023-06-01
Payer: COMMERCIAL

## 2023-06-01 VITALS
SYSTOLIC BLOOD PRESSURE: 123 MMHG | DIASTOLIC BLOOD PRESSURE: 67 MMHG | HEIGHT: 66 IN | HEART RATE: 70 BPM | RESPIRATION RATE: 18 BRPM | TEMPERATURE: 98 F | WEIGHT: 193 LBS | BODY MASS INDEX: 31.02 KG/M2

## 2023-06-01 DIAGNOSIS — L30.9 ECZEMA, UNSPECIFIED TYPE: Primary | ICD-10-CM

## 2023-06-01 DIAGNOSIS — N95.1 PERIMENOPAUSAL: ICD-10-CM

## 2023-06-01 DIAGNOSIS — R40.0 DAYTIME SLEEPINESS: ICD-10-CM

## 2023-06-01 DIAGNOSIS — Z00.00 ANNUAL PHYSICAL EXAM: ICD-10-CM

## 2023-06-01 DIAGNOSIS — R53.83 FATIGUE, UNSPECIFIED TYPE: ICD-10-CM

## 2023-06-01 DIAGNOSIS — E03.9 HYPOTHYROIDISM, UNSPECIFIED TYPE: ICD-10-CM

## 2023-06-01 DIAGNOSIS — M25.50 MULTIPLE JOINT PAIN: ICD-10-CM

## 2023-06-01 PROCEDURE — 99214 OFFICE O/P EST MOD 30 MIN: CPT | Mod: PBBFAC | Performed by: FAMILY MEDICINE

## 2023-06-01 RX ORDER — BETAMETHASONE DIPROPIONATE 0.5 MG/G
CREAM TOPICAL 2 TIMES DAILY
Qty: 15 G | Refills: 5 | Status: SHIPPED | OUTPATIENT
Start: 2023-06-01 | End: 2023-08-24

## 2023-06-01 RX ORDER — LEVOTHYROXINE SODIUM 75 UG/1
1 TABLET ORAL EVERY MORNING
COMMUNITY
Start: 2023-05-01 | End: 2023-11-02 | Stop reason: CLARIF

## 2023-06-01 NOTE — PROGRESS NOTES
"Subjective:       Patient ID: Maricel Sepulveda is a 62 y.o. female.    Chief Complaint: Establish Care      HPI  61 yo female with fatigue, daytime sleepiness, and hand rash.  Thumbnails are pitted and fingertips are eczematous.  Having daily fatigue and sleepiness.  Multiple joint pain.    Review of Systems   Constitutional:  Positive for fatigue. Negative for activity change and unexpected weight change.   HENT:  Negative for hearing loss, rhinorrhea and trouble swallowing.    Eyes:  Negative for discharge and visual disturbance.   Respiratory:  Negative for chest tightness and wheezing.    Cardiovascular:  Negative for chest pain and palpitations.   Gastrointestinal:  Negative for blood in stool, constipation, diarrhea and vomiting.   Endocrine: Negative for polydipsia and polyuria.   Genitourinary:  Negative for difficulty urinating, dysuria, hematuria and menstrual problem.   Musculoskeletal:  Positive for arthralgias. Negative for joint swelling and neck pain.   Neurological:  Negative for weakness and headaches.   Psychiatric/Behavioral:  Negative for confusion and dysphoric mood.        Objective:       Vital Signs  Temp: 98.4 °F (36.9 °C)  Temp Source: Oral  Pulse: 70  Resp: 18  BP: 123/67  BP Location: Left arm  Patient Position: Sitting  Height and Weight  Height: 5' 6" (167.6 cm)  Weight: 87.5 kg (193 lb)  BSA (Calculated - sq m): 2.02 sq meters  BMI (Calculated): 31.2  Weight in (lb) to have BMI = 25: 154.6]  Physical Exam  Constitutional:       Appearance: Normal appearance.   HENT:      Head: Normocephalic and atraumatic.   Eyes:      Extraocular Movements: Extraocular movements intact.      Conjunctiva/sclera: Conjunctivae normal.   Cardiovascular:      Rate and Rhythm: Normal rate and regular rhythm.      Heart sounds: Normal heart sounds.   Pulmonary:      Breath sounds: Normal breath sounds.   Skin:     Comments: Bilateral pitted thumbnails and multi-digit eczematous changes to fingertips on " bilateral hands.    Neurological:      General: No focal deficit present.      Mental Status: She is alert.   Psychiatric:         Mood and Affect: Mood and affect normal.         Speech: Speech normal.         Behavior: Behavior normal. Behavior is cooperative.         Thought Content: Thought content does not include homicidal or suicidal ideation.       Assessment:       Problem List Items Addressed This Visit          Endocrine    Hypothyroidism     Other Visit Diagnoses       Eczema, unspecified type    -  Primary    Relevant Medications    betamethasone dipropionate 0.05 % cream    Other Relevant Orders    Lupus Comprehensive Panel    Multiple joint pain        Relevant Orders    Lupus Comprehensive Panel    X-Ray Hip 2 or 3 views Right (with Pelvis when performed)    X-Ray Hip 2 or 3 views Left (with Pelvis when performed)    X-Ray Ankle Complete Bilateral    Annual physical exam        Relevant Orders    Lipid Panel    Polysomnogram (CPAP will be added if patient meets diagnostic criteria.)    Daytime sleepiness        Relevant Orders    Polysomnogram (CPAP will be added if patient meets diagnostic criteria.)    Fatigue, unspecified type        Perimenopausal                Plan:   Labs pending  ER precautions  FU in 3 months

## 2023-06-15 ENCOUNTER — OFFICE VISIT (OUTPATIENT)
Dept: FAMILY MEDICINE | Facility: CLINIC | Age: 63
End: 2023-06-15
Payer: COMMERCIAL

## 2023-06-15 VITALS
BODY MASS INDEX: 30.57 KG/M2 | OXYGEN SATURATION: 97 % | DIASTOLIC BLOOD PRESSURE: 80 MMHG | TEMPERATURE: 98 F | HEART RATE: 62 BPM | WEIGHT: 190.19 LBS | RESPIRATION RATE: 18 BRPM | SYSTOLIC BLOOD PRESSURE: 133 MMHG | HEIGHT: 66 IN

## 2023-06-15 DIAGNOSIS — L30.9 CHRONIC ECZEMA OF HAND: ICD-10-CM

## 2023-06-15 DIAGNOSIS — L30.1 DYSHIDROTIC ECZEMA: ICD-10-CM

## 2023-06-15 PROCEDURE — 99215 OFFICE O/P EST HI 40 MIN: CPT | Mod: PBBFAC | Performed by: DERMATOLOGY

## 2023-06-15 RX ORDER — TACROLIMUS 1 MG/G
OINTMENT TOPICAL 2 TIMES DAILY
Qty: 60 G | Refills: 2 | Status: SHIPPED | OUTPATIENT
Start: 2023-06-15 | End: 2023-09-07 | Stop reason: SDUPTHER

## 2023-06-15 NOTE — PROGRESS NOTES
"[facility] OFFICE VISIT NOTE  Maricel Sepulveda  [id number]  06/15/2023    Chief Complaint   Patient presents with    Atopic Dermatitis       Maricel Sepulveda is a 62 y.o. female  referred to Derm clinic secondary to bilateral hand and feet eczema. 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 She is allergic to chromate, nickel, rubber, latex.      Review of Systems   Respiratory:  Negative for cough and shortness of breath.    Skin:  Positive for color change. Negative for rash and wound.     Blood pressure 133/80, pulse 62, temperature 98.3 °F (36.8 °C), temperature source Oral, resp. rate 18, height 5' 6" (1.676 m), weight 86.3 kg (190 lb 3.2 oz), SpO2 97 %.   Physical Exam  Skin:     General: Skin is warm.      Findings: No bruising or erythema.      Comments: UE: dry, thinning skin of the palms and fingers B/L. Eczematous changes noted       Current Medications:   Current Outpatient Medications   Medication Sig Dispense Refill    betamethasone dipropionate 0.05 % cream Apply topically 2 (two) times daily. 15 g 5    diclofenac sodium (VOLTAREN) 1 % Gel Apply 2 g topically 4 (four) times daily.      estradiol 0.025 mg/24 hr 0.025 mg/24 hr Place 1 patch onto the skin every 7 days.      EUTHYROX 75 mcg tablet Take 75 mcg by mouth every morning.      levothyroxine (SYNTHROID) 75 MCG tablet Take 1 tablet by mouth every morning.      loratadine (CLARITIN) 10 mg tablet Take 1 tablet by mouth once daily.      progesterone (PROMETRIUM) 100 MG capsule Take 100 mg by mouth every evening.      tacrolimus (PROTOPIC) 0.1 % ointment Apply topically 2 (two) times daily. 60 g 2     No current facility-administered medications for this visit.       Assessment:   1. Chronic eczema of hand    2. Dyshidrotic eczema of feet     -Discontinue betamethasone secondary to skin " thinning  -Rx sent for tacrolimus 0.1% ointment. Apply to eczematous area of hands and feet twice/day as needed for flare ups   -OTC Vanicream moisturizing cream (apply 3-4x/day on hands and feet)  -Advised on lukewarm, tepid water use      Atopic contact dermatitis  -Likely secondary to formaldehyde from clothing  -Advised against leather/rubber shoes and costume jewelry  -Avoid latex gloves, use synthetic gloves such as vinyl or nitrile    Orders Placed This Encounter    tacrolimus (PROTOPIC) 0.1 % ointment       Return to clinic in 12 weeks for follow up    Sancho Jason  [facility] Resident

## 2023-06-16 ENCOUNTER — TELEPHONE (OUTPATIENT)
Dept: FAMILY MEDICINE | Facility: CLINIC | Age: 63
End: 2023-06-16
Payer: COMMERCIAL

## 2023-06-21 ENCOUNTER — PATIENT MESSAGE (OUTPATIENT)
Dept: ADMINISTRATIVE | Facility: HOSPITAL | Age: 63
End: 2023-06-21
Payer: COMMERCIAL

## 2023-06-23 ENCOUNTER — HOSPITAL ENCOUNTER (OUTPATIENT)
Dept: RADIOLOGY | Facility: HOSPITAL | Age: 63
Discharge: HOME | End: 2023-06-23
Attending: FAMILY MEDICINE
Payer: COMMERCIAL

## 2023-06-23 DIAGNOSIS — M25.50 MULTIPLE JOINT PAIN: ICD-10-CM

## 2023-06-23 PROCEDURE — 73502 X-RAY EXAM HIP UNI 2-3 VIEWS: CPT | Mod: TC,LT

## 2023-06-23 PROCEDURE — 73502 X-RAY EXAM HIP UNI 2-3 VIEWS: CPT | Mod: TC,RT

## 2023-06-23 PROCEDURE — 73610 X-RAY EXAM OF ANKLE: CPT | Mod: TC,50

## 2023-06-29 ENCOUNTER — OFFICE VISIT (OUTPATIENT)
Dept: FAMILY MEDICINE | Facility: CLINIC | Age: 63
End: 2023-06-29
Payer: COMMERCIAL

## 2023-06-29 VITALS
HEART RATE: 73 BPM | TEMPERATURE: 99 F | OXYGEN SATURATION: 97 % | DIASTOLIC BLOOD PRESSURE: 7 MMHG | SYSTOLIC BLOOD PRESSURE: 122 MMHG | RESPIRATION RATE: 18 BRPM | HEIGHT: 66 IN | BODY MASS INDEX: 30.53 KG/M2 | WEIGHT: 190 LBS

## 2023-06-29 DIAGNOSIS — M12.9 ARTHRITIS, MULTIPLE JOINT INVOLVEMENT: Primary | ICD-10-CM

## 2023-06-29 DIAGNOSIS — J30.9 ALLERGIC RHINITIS, UNSPECIFIED SEASONALITY, UNSPECIFIED TRIGGER: ICD-10-CM

## 2023-06-29 PROCEDURE — 99214 OFFICE O/P EST MOD 30 MIN: CPT | Mod: PBBFAC | Performed by: FAMILY MEDICINE

## 2023-06-29 RX ORDER — MINERAL OIL
180 ENEMA (ML) RECTAL DAILY
Qty: 30 TABLET | Refills: 5 | Status: SHIPPED | OUTPATIENT
Start: 2023-06-29 | End: 2024-02-22 | Stop reason: SDUPTHER

## 2023-06-29 RX ORDER — MELOXICAM 15 MG/1
15 TABLET ORAL DAILY
Qty: 30 TABLET | Refills: 1 | Status: SHIPPED | OUTPATIENT
Start: 2023-06-29 | End: 2023-08-24 | Stop reason: SDUPTHER

## 2023-06-29 NOTE — PROGRESS NOTES
"Subjective:       Patient ID: Maricel Sepulveda is a 62 y.o. female.    Chief Complaint: Follow-up (Review X-rays and lab results) and Wants to change allergy medication to Allegra      HPI  61 yo female here to FU on labs and x-rays.  No change in symptoms.  Would like to change to Allegra for allergies.    Labs were negative for rheumatologic problems.  She is willing to try meloxicam daily.   Review of Systems   Constitutional:  Positive for fatigue. Negative for activity change and unexpected weight change.   HENT:  Negative for hearing loss and trouble swallowing.    Eyes:  Negative for discharge.   Respiratory:  Negative for chest tightness and wheezing.    Cardiovascular:  Negative for chest pain and palpitations.   Gastrointestinal:  Negative for constipation, diarrhea and vomiting.   Genitourinary:  Negative for difficulty urinating and hematuria.   Musculoskeletal:  Positive for arthralgias. Negative for neck pain.   Neurological:  Negative for headaches.   Psychiatric/Behavioral:  Negative for dysphoric mood.        Objective:       Vital Signs  Temp: 98.6 °F (37 °C)  Temp Source: Oral  Pulse: 73  Resp: 18  SpO2: 97 %  BP: (!) 122/7  BP Location: Right arm  Patient Position: Sitting  Height and Weight  Height: 5' 6" (167.6 cm)  Weight: 86.2 kg (190 lb)  BSA (Calculated - sq m): 2 sq meters  BMI (Calculated): 30.7  Weight in (lb) to have BMI = 25: 154.6]  Physical Exam  Constitutional:       Appearance: Normal appearance.   HENT:      Head: Normocephalic and atraumatic.   Eyes:      Extraocular Movements: Extraocular movements intact.      Conjunctiva/sclera: Conjunctivae normal.   Cardiovascular:      Rate and Rhythm: Normal rate and regular rhythm.      Heart sounds: Normal heart sounds.   Pulmonary:      Breath sounds: Normal breath sounds.   Musculoskeletal:      Comments: Left wrist- splint in place   Skin:     General: Skin is warm and dry.   Neurological:      General: No focal deficit present.      " Mental Status: She is alert.   Psychiatric:         Mood and Affect: Mood and affect normal.         Speech: Speech normal.         Behavior: Behavior normal. Behavior is cooperative.         Thought Content: Thought content does not include homicidal or suicidal ideation.       Assessment:       Problem List Items Addressed This Visit    None  Visit Diagnoses       Arthritis, multiple joint involvement    -  Primary    Relevant Medications    meloxicam (MOBIC) 15 MG tablet    Allergic rhinitis, unspecified seasonality, unspecified trigger        Relevant Medications    fexofenadine (ALLEGRA) 180 MG tablet            Plan:   Encouraged warm/cold compresses and acetaminophen as needed  ER precautions  Follow-up in 4 weeks

## 2023-07-06 NOTE — PROGRESS NOTES
I saw the patient with the resident physician, evaluated and recommended treatment.    Addy Childers MD

## 2023-08-24 ENCOUNTER — OFFICE VISIT (OUTPATIENT)
Dept: FAMILY MEDICINE | Facility: CLINIC | Age: 63
End: 2023-08-24
Payer: COMMERCIAL

## 2023-08-24 VITALS
RESPIRATION RATE: 18 BRPM | WEIGHT: 193.81 LBS | HEIGHT: 66 IN | SYSTOLIC BLOOD PRESSURE: 110 MMHG | OXYGEN SATURATION: 97 % | BODY MASS INDEX: 31.15 KG/M2 | DIASTOLIC BLOOD PRESSURE: 70 MMHG | TEMPERATURE: 98 F | HEART RATE: 60 BPM

## 2023-08-24 DIAGNOSIS — G47.19 EXCESSIVE DAYTIME SLEEPINESS: ICD-10-CM

## 2023-08-24 DIAGNOSIS — M12.9 ARTHRITIS, MULTIPLE JOINT INVOLVEMENT: ICD-10-CM

## 2023-08-24 DIAGNOSIS — G47.00 INSOMNIA, UNSPECIFIED TYPE: Primary | ICD-10-CM

## 2023-08-24 PROCEDURE — 99214 OFFICE O/P EST MOD 30 MIN: CPT | Mod: PBBFAC | Performed by: FAMILY MEDICINE

## 2023-08-24 RX ORDER — MELOXICAM 15 MG/1
15 TABLET ORAL DAILY
Qty: 30 TABLET | Refills: 1 | Status: SHIPPED | OUTPATIENT
Start: 2023-08-24 | End: 2024-02-22 | Stop reason: SDUPTHER

## 2023-08-24 RX ORDER — TRAZODONE HYDROCHLORIDE 50 MG/1
50 TABLET ORAL NIGHTLY
Qty: 30 TABLET | Refills: 1 | Status: SHIPPED | OUTPATIENT
Start: 2023-08-24 | End: 2024-08-23

## 2023-08-24 NOTE — PROGRESS NOTES
"Subjective:       Patient ID: Maricel Sepulveda is a 63 y.o. female.    Chief Complaint: Follow-up, Allergies (Seasonal), and Arthritis (Review x-rays from last visit. /C/O  acute left leg pain. )      Follow-up  Pertinent negatives include no arthralgias, chest pain, headaches, joint swelling, neck pain, vomiting or weakness.   Arthritis  She reports no joint swelling. Pertinent negatives include no diarrhea or dysuria.     64 yo female with seasonal allergies and multiple joint arthritis.  Has not had sleep study yet.  Has insomnia with frequent awakenings at night.  Excessive daytime sleepiness.    Reviewed labs and X-Rays- no acute abnormalities. Voiced understanding.   Review of Systems   Constitutional:  Negative for activity change and unexpected weight change.   HENT:  Negative for hearing loss, rhinorrhea and trouble swallowing.    Eyes:  Negative for discharge and visual disturbance.   Respiratory:  Negative for chest tightness and wheezing.    Cardiovascular:  Negative for chest pain and palpitations.   Gastrointestinal:  Negative for blood in stool, constipation, diarrhea and vomiting.   Endocrine: Negative for polydipsia and polyuria.   Genitourinary:  Negative for difficulty urinating, dysuria, hematuria and menstrual problem.   Musculoskeletal:  Positive for arthritis. Negative for arthralgias, joint swelling and neck pain.   Neurological:  Negative for weakness and headaches.   Psychiatric/Behavioral:  Positive for dysphoric mood. Negative for confusion.          Objective:       Vital Signs  Temp: 97.5 °F (36.4 °C)  Temp Source: Oral  Pulse: 60  Resp: 18  SpO2: 97 %  BP: 110/70  BP Location: Right arm  Patient Position: Sitting  Height and Weight  Height: 5' 6" (167.6 cm)  Weight: 87.9 kg (193 lb 12.8 oz)  BSA (Calculated - sq m): 2.02 sq meters  BMI (Calculated): 31.3  Weight in (lb) to have BMI = 25: 154.6]  Physical Exam  Constitutional:       Appearance: Normal appearance.   HENT:      Head: " Normocephalic and atraumatic.   Eyes:      Extraocular Movements: Extraocular movements intact.      Conjunctiva/sclera: Conjunctivae normal.   Cardiovascular:      Rate and Rhythm: Normal rate and regular rhythm.      Heart sounds: Normal heart sounds.   Pulmonary:      Breath sounds: Normal breath sounds.   Skin:     General: Skin is warm and dry.   Neurological:      General: No focal deficit present.      Mental Status: She is alert.   Psychiatric:         Mood and Affect: Mood and affect normal.         Speech: Speech normal.         Behavior: Behavior normal. Behavior is cooperative.         Thought Content: Thought content does not include homicidal or suicidal ideation.         Assessment:       Problem List Items Addressed This Visit    None  Visit Diagnoses       Insomnia, unspecified type    -  Primary    Relevant Medications    traZODone (DESYREL) 50 MG tablet    Arthritis, multiple joint involvement        Relevant Medications    meloxicam (MOBIC) 15 MG tablet    Excessive daytime sleepiness        Relevant Orders    Ambulatory referral/consult to Sleep Disorders            Plan:   Sent new referral for outside sleep study  Will try trazodone for insomnia  ER precautions  FU in 1 month

## 2023-09-07 ENCOUNTER — OFFICE VISIT (OUTPATIENT)
Dept: FAMILY MEDICINE | Facility: CLINIC | Age: 63
End: 2023-09-07
Payer: COMMERCIAL

## 2023-09-07 VITALS
RESPIRATION RATE: 16 BRPM | HEIGHT: 66 IN | HEART RATE: 64 BPM | DIASTOLIC BLOOD PRESSURE: 78 MMHG | BODY MASS INDEX: 30.99 KG/M2 | TEMPERATURE: 98 F | SYSTOLIC BLOOD PRESSURE: 131 MMHG | OXYGEN SATURATION: 99 % | WEIGHT: 192.81 LBS

## 2023-09-07 DIAGNOSIS — L30.9 CHRONIC ECZEMA OF HAND: Primary | ICD-10-CM

## 2023-09-07 PROCEDURE — 99214 OFFICE O/P EST MOD 30 MIN: CPT | Mod: PBBFAC | Performed by: DERMATOLOGY

## 2023-09-07 RX ORDER — TACROLIMUS 1 MG/G
OINTMENT TOPICAL 2 TIMES DAILY
Qty: 60 G | Refills: 5 | Status: SHIPPED | OUTPATIENT
Start: 2023-09-07

## 2023-09-07 NOTE — PROGRESS NOTES
"[facility] OFFICE VISIT NOTE  Maricel Sepulveda  [id number]  09/07/2023    Chief Complaint   Patient presents with    Eczema       Maricel Sepulveda is a 63 y.o. female  presents to [facility] Derm clinic for bilateral hand and feet eczema f/u; last seen 6/15/2023 Initiated on Tacrolimus. Today patient reports complete resolution of eczema, no flares since last visit. Also using Cera Ve. Pleased with current treatment regimen.      Review of Systems   Constitutional:  Negative for fever.   Respiratory:  Negative for shortness of breath.    Cardiovascular:  Negative for chest pain.       Blood pressure 131/78, pulse 64, temperature 98.2 °F (36.8 °C), temperature source Oral, resp. rate 16, height 5' 6" (1.676 m), weight 87.5 kg (192 lb 12.8 oz), SpO2 99 %.   Physical Exam  Constitutional:       General: She is not in acute distress.  Skin:     General: Skin is warm and dry.      Findings: No bruising, erythema or rash.      Comments: UE: dry, thinning skin of the palms and fingers B/L. No Eczematous changes noted         Current Medications:   Current Outpatient Medications   Medication Sig Dispense Refill    diclofenac sodium (VOLTAREN) 1 % Gel Apply 2 g topically 4 (four) times daily.      estradiol 0.025 mg/24 hr 0.025 mg/24 hr Place 1 patch onto the skin every 7 days.      EUTHYROX 75 mcg tablet Take 75 mcg by mouth every morning.      fexofenadine (ALLEGRA) 180 MG tablet Take 1 tablet (180 mg total) by mouth once daily. 30 tablet 5    levothyroxine (SYNTHROID) 75 MCG tablet Take 1 tablet by mouth every morning.      meloxicam (MOBIC) 15 MG tablet Take 1 tablet (15 mg total) by mouth once daily. 30 tablet 1    progesterone (PROMETRIUM) 100 MG capsule Take 100 mg by mouth every evening.      tacrolimus (PROTOPIC) 0.1 % ointment Apply topically 2 (two) times daily. 60 g 2    traZODone (DESYREL) 50 MG tablet Take 1 tablet (50 mg total) by mouth every evening. 30 tablet 1     No current facility-administered medications for this " visit.       Assessment:     Chronic eczema of hand  -Stable  -Refilled and continued tacrolimus 0.1% ointment. Apply to eczematous area of hands and feet twice/day as needed for flare ups   -OTC Cera Ve moisturizing cream (apply 3-4x/day on hands and feet)  -Continue general hand care  -Advised on lukewarm, tepid water use  -Advised against leather/rubber shoes and costume jewelry  -Avoid latex gloves, use synthetic gloves such as vinyl or nitrile       Return to Derm clinic PRN for follow up. Can follow up with PCP unless significant flare then can be seen in Derm    Jaelyn Mendoza  [facility] Resident

## 2023-09-14 ENCOUNTER — OFFICE VISIT (OUTPATIENT)
Dept: FAMILY MEDICINE | Facility: CLINIC | Age: 63
End: 2023-09-14
Payer: COMMERCIAL

## 2023-09-14 VITALS
HEIGHT: 66 IN | HEART RATE: 63 BPM | TEMPERATURE: 98 F | BODY MASS INDEX: 31.15 KG/M2 | SYSTOLIC BLOOD PRESSURE: 112 MMHG | RESPIRATION RATE: 18 BRPM | DIASTOLIC BLOOD PRESSURE: 74 MMHG | OXYGEN SATURATION: 98 % | WEIGHT: 193.81 LBS

## 2023-09-14 DIAGNOSIS — G47.00 INSOMNIA, UNSPECIFIED TYPE: ICD-10-CM

## 2023-09-14 DIAGNOSIS — M25.532 LEFT WRIST PAIN: Primary | ICD-10-CM

## 2023-09-14 PROCEDURE — 99214 OFFICE O/P EST MOD 30 MIN: CPT | Mod: PBBFAC | Performed by: FAMILY MEDICINE

## 2023-09-14 NOTE — PROGRESS NOTES
"Subjective:       Patient ID: Maricel Sepulveda is a 63 y.o. female.    Chief Complaint: Follow-up, Insomnia, and Arthritis      Follow-up  Pertinent negatives include no chest pain, headaches or vomiting.   Arthritis  Pertinent negatives include no diarrhea.     62 yo female with insomnia and multiple joint pain.  Left wrist is worst.  We will obtain a CT of wrist.  Insomnia is minimally improved on trazodone.  She would like to continue the current dose to determine if it is working.  Still awaiting sleep study.   Review of Systems   Constitutional:  Negative for activity change.   HENT:  Negative for hearing loss and trouble swallowing.    Eyes:  Negative for discharge.   Respiratory:  Negative for chest tightness and wheezing.    Cardiovascular:  Negative for chest pain and palpitations.   Gastrointestinal:  Negative for constipation, diarrhea and vomiting.   Genitourinary:  Negative for difficulty urinating and hematuria.   Musculoskeletal:  Positive for arthritis.   Neurological:  Negative for headaches.   Psychiatric/Behavioral:  Positive for dysphoric mood.          Objective:       Vital Signs  Temp: 98.2 °F (36.8 °C)  Temp Source: Oral  Pulse: 63  Resp: 18  SpO2: 98 %  BP: 112/74  BP Location: Right arm  Patient Position: Sitting  Height and Weight  Height: 5' 6" (167.6 cm)  Weight: 87.9 kg (193 lb 12.8 oz)  BSA (Calculated - sq m): 2.02 sq meters  BMI (Calculated): 31.3  Weight in (lb) to have BMI = 25: 154.6]  Physical Exam  Constitutional:       Appearance: Normal appearance.   HENT:      Head: Normocephalic and atraumatic.   Eyes:      Extraocular Movements: Extraocular movements intact.      Conjunctiva/sclera: Conjunctivae normal.   Cardiovascular:      Rate and Rhythm: Normal rate and regular rhythm.      Heart sounds: Normal heart sounds.   Pulmonary:      Breath sounds: Normal breath sounds.   Musculoskeletal:      Comments: Left wrist- splint in place   Skin:     General: Skin is warm and dry. "   Neurological:      General: No focal deficit present.      Mental Status: She is alert.   Psychiatric:         Mood and Affect: Mood and affect normal.         Speech: Speech normal.         Behavior: Behavior normal. Behavior is cooperative.         Thought Content: Thought content does not include homicidal or suicidal ideation.         Assessment:       Problem List Items Addressed This Visit    None  Visit Diagnoses       Left wrist pain    -  Primary    Relevant Orders    CT Wrist Without Contrast Left    Insomnia, unspecified type                Plan:   Awaiting sleep study  Continue current medications  FU in 3 months

## 2023-09-21 ENCOUNTER — HOSPITAL ENCOUNTER (OUTPATIENT)
Dept: RADIOLOGY | Facility: HOSPITAL | Age: 63
Discharge: HOME | End: 2023-09-21
Attending: FAMILY MEDICINE
Payer: COMMERCIAL

## 2023-09-21 DIAGNOSIS — M25.532 LEFT WRIST PAIN: ICD-10-CM

## 2023-09-21 PROCEDURE — 73200 CT UPPER EXTREMITY W/O DYE: CPT | Mod: TC,LT

## 2023-11-02 ENCOUNTER — OFFICE VISIT (OUTPATIENT)
Dept: FAMILY MEDICINE | Facility: CLINIC | Age: 63
End: 2023-11-02
Payer: COMMERCIAL

## 2023-11-02 VITALS
OXYGEN SATURATION: 98 % | BODY MASS INDEX: 31.63 KG/M2 | WEIGHT: 196.81 LBS | TEMPERATURE: 98 F | HEIGHT: 66 IN | RESPIRATION RATE: 18 BRPM | DIASTOLIC BLOOD PRESSURE: 72 MMHG | HEART RATE: 70 BPM | SYSTOLIC BLOOD PRESSURE: 121 MMHG

## 2023-11-02 DIAGNOSIS — M25.532 LEFT WRIST PAIN: Primary | ICD-10-CM

## 2023-11-02 PROCEDURE — 99213 OFFICE O/P EST LOW 20 MIN: CPT | Mod: PBBFAC | Performed by: FAMILY MEDICINE

## 2023-11-02 RX ORDER — PREDNISONE 20 MG/1
20 TABLET ORAL DAILY
Qty: 5 TABLET | Refills: 0 | Status: SHIPPED | OUTPATIENT
Start: 2023-11-02 | End: 2023-11-07

## 2023-11-02 NOTE — PROGRESS NOTES
"Subjective:       Patient ID: Maricel Sepulveda is a 63 y.o. female.    Chief Complaint: Follow-up (Discuss CT wrist results/Pt state for the insomina, sleep study completed recently)      Follow-up      63-year-old female here to discuss CT of wrist results.  Her wrist shows postsurgical changes and degenerative changes.  She is still having daily pain in left wrist with weakness.  Patient completed a home sleep study yesterday.  She will ensure that we get those results.  Review of Systems   Constitutional:         As above   Musculoskeletal:         As above         Objective:       Vital Signs  Temp: 97.9 °F (36.6 °C)  Temp Source: Oral  Pulse: 70  Resp: 18  SpO2: 98 %  BP: 121/72  BP Location: Left arm  Patient Position: Sitting  Height and Weight  Height: 5' 6" (167.6 cm)  Weight: 89.3 kg (196 lb 12.8 oz)  BSA (Calculated - sq m): 2.04 sq meters  BMI (Calculated): 31.8  Weight in (lb) to have BMI = 25: 154.6]  Physical Exam  Vitals reviewed.   Constitutional:       Appearance: Normal appearance.   HENT:      Head: Normocephalic and atraumatic.   Eyes:      Extraocular Movements: Extraocular movements intact.      Conjunctiva/sclera: Conjunctivae normal.   Cardiovascular:      Rate and Rhythm: Normal rate and regular rhythm.      Heart sounds: Normal heart sounds.   Pulmonary:      Breath sounds: Normal breath sounds.   Skin:     General: Skin is warm and dry.   Neurological:      General: No focal deficit present.      Mental Status: She is alert.   Psychiatric:         Mood and Affect: Mood and affect normal.         Speech: Speech normal.         Behavior: Behavior normal. Behavior is cooperative.         Thought Content: Thought content does not include homicidal or suicidal ideation.         Assessment:       Problem List Items Addressed This Visit    None  Visit Diagnoses       Left wrist pain    -  Primary    Relevant Medications    predniSONE (DELTASONE) 20 MG tablet            Plan:   Awaiting sleep study " results   ER precautions   Follow-up in 3 months

## 2023-11-22 ENCOUNTER — OFFICE VISIT (OUTPATIENT)
Dept: URGENT CARE | Facility: CLINIC | Age: 63
End: 2023-11-22
Payer: COMMERCIAL

## 2023-11-22 VITALS
HEART RATE: 85 BPM | BODY MASS INDEX: 32.47 KG/M2 | HEIGHT: 65 IN | WEIGHT: 194.88 LBS | SYSTOLIC BLOOD PRESSURE: 163 MMHG | RESPIRATION RATE: 20 BRPM | OXYGEN SATURATION: 100 % | DIASTOLIC BLOOD PRESSURE: 81 MMHG | TEMPERATURE: 101 F

## 2023-11-22 DIAGNOSIS — U07.1 COVID-19: Primary | ICD-10-CM

## 2023-11-22 DIAGNOSIS — R05.9 COUGH, UNSPECIFIED TYPE: ICD-10-CM

## 2023-11-22 DIAGNOSIS — U07.1 COVID-19 VIRUS DETECTED: ICD-10-CM

## 2023-11-22 DIAGNOSIS — J02.9 SORE THROAT: ICD-10-CM

## 2023-11-22 LAB
CTP QC/QA: YES
MOLECULAR STREP A: NEGATIVE
POC MOLECULAR INFLUENZA A AGN: NEGATIVE
POC MOLECULAR INFLUENZA B AGN: NEGATIVE
SARS-COV-2 RDRP RESP QL NAA+PROBE: POSITIVE

## 2023-11-22 PROCEDURE — 87635 SARS-COV-2 COVID-19 AMP PRB: CPT | Mod: PBBFAC | Performed by: FAMILY MEDICINE

## 2023-11-22 PROCEDURE — 99213 PR OFFICE/OUTPT VISIT, EST, LEVL III, 20-29 MIN: ICD-10-PCS | Mod: S$PBB,,, | Performed by: FAMILY MEDICINE

## 2023-11-22 PROCEDURE — 99213 OFFICE O/P EST LOW 20 MIN: CPT | Mod: S$PBB,,, | Performed by: FAMILY MEDICINE

## 2023-11-22 PROCEDURE — 87651 STREP A DNA AMP PROBE: CPT | Mod: PBBFAC | Performed by: FAMILY MEDICINE

## 2023-11-22 PROCEDURE — 87502 INFLUENZA DNA AMP PROBE: CPT | Mod: PBBFAC | Performed by: FAMILY MEDICINE

## 2023-11-22 PROCEDURE — 99215 OFFICE O/P EST HI 40 MIN: CPT | Mod: PBBFAC | Performed by: FAMILY MEDICINE

## 2023-11-22 NOTE — LETTER
November 22, 2023      [facility]  [address] [address]  Phone: [phone]       Patient: Maricel Sepulveda   YOB: 1960  Date of Visit: 11/22/2023    To Whom It May Concern:    Tevin Sepulveda  was at [facility] on 11/22/2023. The patient may return to work/school on NOV 26 2023 with no restrictions. If you have any questions or concerns, or if I can be of further assistance, please do not hesitate to contact me.    Sincerely,    REAGAN JOSHUA MD

## 2023-11-22 NOTE — LETTER
November 22, 2023      [facility]  [address] [address]  Phone: [phone]       Patient: Maricel Sepulveda   YOB: 1960  Date of Visit: 11/22/2023    To Whom It May Concern:    Tevin Sepulveda  was at [facility] on 11/22/2023. The patient may return to work/school on NOV 27 2023 with no restrictions. If you have any questions or concerns, or if I can be of further assistance, please do not hesitate to contact me.    Sincerely,    REAGAN JOSHUA MD

## 2023-11-22 NOTE — PROGRESS NOTES
"Subjective:       Patient ID: Maricel Sepulveda is a 63 y.o. female.    Vitals:  height is 5' 5" (1.651 m) and weight is 88.4 kg (194 lb 14.2 oz). Her temperature is 100.6 °F (38.1 °C) (abnormal). Her blood pressure is 163/81 (abnormal) and her pulse is 85. Her respiration is 20 and oxygen saturation is 100%.     Chief Complaint: Cough ( X today) and Sore Throat    Patient with 1 day of mild cough, scratchy throat, myalgias.    Cough  Associated symptoms include a fever, myalgias, nasal congestion, rhinorrhea and a sore throat. Pertinent negatives include no chest pain, ear pain, hemoptysis, rash, shortness of breath or wheezing.         Constitution: Positive for fever.   HENT:  Positive for sore throat. Negative for ear pain and trouble swallowing.    Cardiovascular:  Negative for chest pain.   Respiratory:  Positive for cough. Negative for bloody sputum, shortness of breath and wheezing.    Musculoskeletal:  Positive for muscle ache.   Skin:  Negative for rash.       Objective:   Physical Exam   Constitutional: She appears well-developed.  Non-toxic appearance. She does not appear ill. No distress.   HENT:   Head: Atraumatic.   Nose: No purulent discharge. Right sinus exhibits no maxillary sinus tenderness and no frontal sinus tenderness. Left sinus exhibits no maxillary sinus tenderness and no frontal sinus tenderness.   Mouth/Throat: Uvula is midline.   Eyes: Right eye exhibits no discharge. Left eye exhibits no discharge. Extraocular movement intact   Neck: Neck supple. No neck rigidity present.   Cardiovascular: Regular rhythm.   Pulmonary/Chest: Effort normal and breath sounds normal. No respiratory distress. She has no wheezes. She has no rales.   Lymphadenopathy:     She has no cervical adenopathy.   Neurological: She is alert.   Skin: Skin is warm, dry and not diaphoretic.   Psychiatric: Her behavior is normal.   Nursing note and vitals reviewed.    Results for orders placed or performed in visit on 11/22/23 "   POCT COVID-19 Rapid Screening   Result Value Ref Range    POC Rapid COVID Positive (A) Negative     Acceptable Yes    POCT Influenza A/B Molecular   Result Value Ref Range    POC Molecular Influenza A Ag Negative Negative, Not Reported    POC Molecular Influenza B Ag Negative Negative, Not Reported     Acceptable Yes    POCT Strep A, Molecular   Result Value Ref Range    Molecular Strep A, POC Negative Negative     Acceptable Yes          Assessment:     1. COVID-19    2. Cough, unspecified type    3. Sore throat          Plan:   Discussed COVID-19 isolation instructions, contagious precautions, ER precautions.  Use medications as needed for symptoms.   Had a discussion about Paxlovid.  Patient not interested at this point.  Has had COVID multiple times.    Please follow instructions on patient education material. Seek care immediately if new or worsening symptoms develop.    COVID-19    Cough, unspecified type  -     POCT COVID-19 Rapid Screening  -     POCT Influenza A/B Molecular    Sore throat  -     POCT Strep A, Molecular        Please note: This chart was completed via voice to text dictation. It may contain typographical/word recognition errors. If there are any questions, please contact the provider for final clarification.

## 2023-11-22 NOTE — PATIENT INSTRUCTIONS
If you decide to take Paxlovid, you will need to adjust the following medications:    Trazodone- do not take trazodone until 3 days after completing Paxlovid.

## 2024-02-22 ENCOUNTER — OFFICE VISIT (OUTPATIENT)
Dept: FAMILY MEDICINE | Facility: CLINIC | Age: 64
End: 2024-02-22
Payer: COMMERCIAL

## 2024-02-22 VITALS
RESPIRATION RATE: 18 BRPM | BODY MASS INDEX: 32.32 KG/M2 | HEIGHT: 65 IN | SYSTOLIC BLOOD PRESSURE: 112 MMHG | TEMPERATURE: 98 F | HEART RATE: 69 BPM | DIASTOLIC BLOOD PRESSURE: 71 MMHG | WEIGHT: 194 LBS | OXYGEN SATURATION: 100 %

## 2024-02-22 DIAGNOSIS — J30.9 ALLERGIC RHINITIS, UNSPECIFIED SEASONALITY, UNSPECIFIED TRIGGER: ICD-10-CM

## 2024-02-22 DIAGNOSIS — M25.532 LEFT WRIST PAIN: Primary | ICD-10-CM

## 2024-02-22 DIAGNOSIS — M12.9 ARTHRITIS, MULTIPLE JOINT INVOLVEMENT: ICD-10-CM

## 2024-02-22 LAB — URATE SERPL-MCNC: 4.4 MG/DL (ref 2.6–6)

## 2024-02-22 PROCEDURE — 36415 COLL VENOUS BLD VENIPUNCTURE: CPT | Performed by: FAMILY MEDICINE

## 2024-02-22 PROCEDURE — 99214 OFFICE O/P EST MOD 30 MIN: CPT | Mod: PBBFAC | Performed by: FAMILY MEDICINE

## 2024-02-22 PROCEDURE — 84550 ASSAY OF BLOOD/URIC ACID: CPT | Performed by: FAMILY MEDICINE

## 2024-02-22 RX ORDER — MELOXICAM 15 MG/1
15 TABLET ORAL DAILY
Qty: 30 TABLET | Refills: 1 | Status: SHIPPED | OUTPATIENT
Start: 2024-02-22

## 2024-02-22 RX ORDER — MINERAL OIL
180 ENEMA (ML) RECTAL DAILY
Qty: 30 TABLET | Refills: 11 | Status: SHIPPED | OUTPATIENT
Start: 2024-02-22 | End: 2025-02-21

## 2024-02-22 NOTE — PROGRESS NOTES
"Subjective:       Patient ID: Maricel Sepulveda is a 63 y.o. female.    Chief Complaint: Wrist Pain (Increasing in frequency. Radiates up the arm with swelling) and Medication Refill (Allegra and Mobic)      Wrist Pain     Medication Refill  Pertinent negatives include no chest pain, headaches or vomiting.     64 yo female with left wrist pain.  Was worse over the last few weeks.  Feeling better now.  No new injuries or falls.  Needs refills on Allegra and Mobic.  Pt now has auto-PAP for AIMEE.  Feeling much better.   Review of Systems   Constitutional:  Negative for activity change.   HENT:  Negative for hearing loss and trouble swallowing.    Eyes:  Negative for discharge.   Respiratory:  Negative for chest tightness and wheezing.    Cardiovascular:  Negative for chest pain and palpitations.   Gastrointestinal:  Negative for constipation, diarrhea and vomiting.   Genitourinary:  Negative for difficulty urinating and hematuria.   Neurological:  Negative for headaches.   Psychiatric/Behavioral:  Negative for dysphoric mood.          Objective:       Vital Signs  Temp: 98.1 °F (36.7 °C)  Temp Source: Oral  Pulse: 69  Resp: 18  SpO2: 100 %  BP: 112/71  BP Location: Right arm  Patient Position: Sitting  Pain Score: 10-Worst pain ever  Height and Weight  Height: 5' 5" (165.1 cm)  Weight: 88 kg (194 lb)  BSA (Calculated - sq m): 2.01 sq meters  BMI (Calculated): 32.3  Weight in (lb) to have BMI = 25: 149.9]  Physical Exam  Constitutional:       Appearance: Normal appearance.   HENT:      Head: Normocephalic and atraumatic.   Eyes:      Extraocular Movements: Extraocular movements intact.      Conjunctiva/sclera: Conjunctivae normal.   Cardiovascular:      Rate and Rhythm: Normal rate and regular rhythm.      Heart sounds: Normal heart sounds.   Pulmonary:      Breath sounds: Normal breath sounds.   Skin:     General: Skin is warm and dry.   Neurological:      General: No focal deficit present.      Mental Status: She is alert. "   Psychiatric:         Mood and Affect: Mood and affect normal.         Speech: Speech normal.         Behavior: Behavior normal. Behavior is cooperative.         Thought Content: Thought content does not include homicidal or suicidal ideation.         Assessment:       Problem List Items Addressed This Visit    None  Visit Diagnoses       Left wrist pain    -  Primary    Relevant Orders    Uric Acid    Arthritis, multiple joint involvement        Relevant Medications    meloxicam (MOBIC) 15 MG tablet    Allergic rhinitis, unspecified seasonality, unspecified trigger        Relevant Medications    fexofenadine (ALLEGRA) 180 MG tablet            Plan:   Labs pending  Encouraged warm/cold compresses and ibuprofen/acetaminophen as needed  ER precautions  Follow-up in 3 months

## 2024-04-28 ENCOUNTER — OFFICE VISIT (OUTPATIENT)
Dept: URGENT CARE | Facility: CLINIC | Age: 64
End: 2024-04-28
Payer: COMMERCIAL

## 2024-04-28 VITALS
DIASTOLIC BLOOD PRESSURE: 76 MMHG | RESPIRATION RATE: 18 BRPM | HEIGHT: 66 IN | BODY MASS INDEX: 31.98 KG/M2 | HEART RATE: 64 BPM | OXYGEN SATURATION: 97 % | SYSTOLIC BLOOD PRESSURE: 128 MMHG | TEMPERATURE: 98 F | WEIGHT: 199 LBS

## 2024-04-28 DIAGNOSIS — H65.93 MIDDLE EAR EFFUSION, BILATERAL: Primary | ICD-10-CM

## 2024-04-28 DIAGNOSIS — R42 DIZZINESS: ICD-10-CM

## 2024-04-28 LAB — GLUCOSE SERPL-MCNC: 81 MG/DL (ref 70–110)

## 2024-04-28 PROCEDURE — 99214 OFFICE O/P EST MOD 30 MIN: CPT | Mod: PBBFAC | Performed by: NURSE PRACTITIONER

## 2024-04-28 PROCEDURE — 99213 OFFICE O/P EST LOW 20 MIN: CPT | Mod: S$PBB,,, | Performed by: NURSE PRACTITIONER

## 2024-04-28 PROCEDURE — 82962 GLUCOSE BLOOD TEST: CPT | Mod: PBBFAC | Performed by: NURSE PRACTITIONER

## 2024-04-28 RX ORDER — PROGESTERONE 100 MG/1
1 CAPSULE ORAL DAILY
COMMUNITY

## 2024-04-28 RX ORDER — FLUTICASONE PROPIONATE 50 MCG
2 SPRAY, SUSPENSION (ML) NASAL DAILY
Qty: 18.2 ML | Refills: 0 | Status: SHIPPED | OUTPATIENT
Start: 2024-04-28 | End: 2024-06-05 | Stop reason: SDUPTHER

## 2024-04-28 RX ORDER — MECLIZINE HCL 12.5 MG 12.5 MG/1
12.5 TABLET ORAL 3 TIMES DAILY PRN
Qty: 15 TABLET | Refills: 0 | Status: SHIPPED | OUTPATIENT
Start: 2024-04-28 | End: 2024-05-23 | Stop reason: SDUPTHER

## 2024-04-28 RX ORDER — LEVOTHYROXINE SODIUM 75 UG/1
1 TABLET ORAL EVERY MORNING
COMMUNITY
End: 2024-05-23

## 2024-04-28 NOTE — PROGRESS NOTES
"Subjective:      Patient ID: Maricel Sepulveda is a 63 y.o. female.    Vitals:  height is 5' 6" (1.676 m) and weight is 90.3 kg (199 lb). Her oral temperature is 98.1 °F (36.7 °C). Her blood pressure is 128/76 and her pulse is 64. Her respiration is 18 and oxygen saturation is 97%.     Chief Complaint: Dizziness (Patient reports dizziness and feeling of being "short winded" x 4 days )    HPI As stated in CC. Pt reports dizziness with changing positions. Pt reports taking claritin daily for allergies. Pt does report shortness of breath at baseline without any worsening or trouble breathing    Neurological:  Positive for dizziness.      Objective:     Physical Exam   Constitutional: She appears well-developed.  Non-toxic appearance. She does not appear ill. No distress.   HENT:   Head: Normocephalic and atraumatic.   Ears:   Right Ear: No no drainage, swelling or tenderness. Tympanic membrane is injected. Tympanic membrane is not perforated, not erythematous and not bulging. A middle ear effusion is present.   Left Ear: No no drainage, swelling or tenderness. Tympanic membrane is not injected, not perforated, not erythematous and not bulging. A middle ear effusion is present.   Nose: No purulent discharge. Right sinus exhibits frontal sinus tenderness. Right sinus exhibits no maxillary sinus tenderness. Left sinus exhibits no maxillary sinus tenderness and no frontal sinus tenderness.   Mouth/Throat: Uvula is midline.   Eyes: Right eye exhibits no discharge. Left eye exhibits no discharge. Extraocular movement intact   Neck: Trachea normal and phonation normal. Neck supple. Normal carotid pulses and no JVD present. Carotid bruit is not present. No thyromegaly present. No thyroid tenderness present. No neck rigidity present.   Cardiovascular: Normal rate, regular rhythm and normal pulses.   Pulmonary/Chest: Effort normal and breath sounds normal. No respiratory distress. She has no wheezes. She has no rales.   Abdominal: " Normal appearance.   Musculoskeletal:      Cervical back: She exhibits no tenderness.   Lymphadenopathy:     She has no cervical adenopathy.   Neurological: She is alert.   Skin: Skin is warm, dry and not diaphoretic.   Psychiatric: Her behavior is normal. Mood, judgment and thought content normal.   Nursing note and vitals reviewed.chaperone present ()         Assessment:     1. Middle ear effusion, bilateral    2. Dizziness        Plan:   Er precautions  - Use flonase every day for 7-14 days along with loratadine/Claritin  - Try Sudafed for a few days, or otc decongestant of choice.  - RTC if you get pain in front of your ear, behind your ear, or in your neck, or if you have fever/drainage, as you would need an antibiotic.    F/u with PCP in 2-3 days  Middle ear effusion, bilateral  -     fluticasone propionate (FLONASE) 50 mcg/actuation nasal spray; 2 sprays (100 mcg total) by Each Nostril route once daily.  Dispense: 18.2 mL; Refill: 0  -     meclizine (ANTIVERT) 12.5 mg tablet; Take 1 tablet (12.5 mg total) by mouth 3 (three) times daily as needed for Dizziness.  Dispense: 15 tablet; Refill: 0    Dizziness  -     POCT Glucose, Hand-Held Device  -     fluticasone propionate (FLONASE) 50 mcg/actuation nasal spray; 2 sprays (100 mcg total) by Each Nostril route once daily.  Dispense: 18.2 mL; Refill: 0          Medical Decision Making:   Differential Diagnosis:   Sinusitis, Labyrinth, Otitis media with effusion

## 2024-04-28 NOTE — PATIENT INSTRUCTIONS
Please follow instructions on patient education material.   Return to urgent care in 2 to 3 days if symptoms are not improving, immediately if you develop any new or worsening symptoms.   See patient education for guidance  - Use flonase every day for 7-14 days along with loratadine/Claritin  - Try Sudafed for a few days, or otc decongestant of choice.  - RTC if you get pain in front of your ear, behind your ear, or in your neck, or if you have fever/drainage, as you would need an antibiotic.    F/u with PCP in 2-3 days    Go to the ER if you experience chest pain with shortness of breath, shortness of breath when moving around your house, high fevers 103.0+, excessive vomiting/diarrhea, or general distress.

## 2024-05-23 ENCOUNTER — OFFICE VISIT (OUTPATIENT)
Dept: FAMILY MEDICINE | Facility: CLINIC | Age: 64
End: 2024-05-23
Payer: COMMERCIAL

## 2024-05-23 VITALS
SYSTOLIC BLOOD PRESSURE: 137 MMHG | OXYGEN SATURATION: 99 % | DIASTOLIC BLOOD PRESSURE: 78 MMHG | TEMPERATURE: 98 F | RESPIRATION RATE: 18 BRPM | HEIGHT: 66 IN | HEART RATE: 62 BPM | BODY MASS INDEX: 33.34 KG/M2 | WEIGHT: 207.44 LBS

## 2024-05-23 DIAGNOSIS — J32.9 BACTERIAL SINUSITIS: ICD-10-CM

## 2024-05-23 DIAGNOSIS — G47.00 INSOMNIA, UNSPECIFIED TYPE: ICD-10-CM

## 2024-05-23 DIAGNOSIS — H66.90 OTITIS MEDIA, UNSPECIFIED LATERALITY, UNSPECIFIED OTITIS MEDIA TYPE: ICD-10-CM

## 2024-05-23 DIAGNOSIS — R42 VERTIGO: Primary | ICD-10-CM

## 2024-05-23 DIAGNOSIS — B96.89 BACTERIAL SINUSITIS: ICD-10-CM

## 2024-05-23 PROCEDURE — 99215 OFFICE O/P EST HI 40 MIN: CPT | Mod: PBBFAC | Performed by: FAMILY MEDICINE

## 2024-05-23 RX ORDER — LORATADINE 10 MG/1
10 TABLET ORAL DAILY PRN
COMMUNITY

## 2024-05-23 RX ORDER — AMOXICILLIN AND CLAVULANATE POTASSIUM 875; 125 MG/1; MG/1
1 TABLET, FILM COATED ORAL 2 TIMES DAILY
Qty: 20 TABLET | Refills: 0 | Status: SHIPPED | OUTPATIENT
Start: 2024-05-23 | End: 2024-06-02

## 2024-05-23 RX ORDER — LEVOTHYROXINE SODIUM 88 UG/1
1 TABLET ORAL EVERY MORNING
COMMUNITY
Start: 2024-05-14 | End: 2025-05-14

## 2024-05-23 RX ORDER — TRAZODONE HYDROCHLORIDE 50 MG/1
50 TABLET ORAL NIGHTLY
Qty: 30 TABLET | Refills: 5 | Status: SHIPPED | OUTPATIENT
Start: 2024-05-23 | End: 2025-05-23

## 2024-05-23 RX ORDER — MECLIZINE HCL 12.5 MG 12.5 MG/1
12.5 TABLET ORAL 3 TIMES DAILY PRN
Qty: 15 TABLET | Refills: 2 | Status: SHIPPED | OUTPATIENT
Start: 2024-05-23

## 2024-05-23 NOTE — LETTER
May 23, 2024      [facility] [address] [place] [address]  Phone: [phone]       Patient: Maricel Sepulveda   YOB: 1960  Date of Visit: 05/23/2024    To Whom It May Concern:    Tevin Sepulveda  was at [facility] on 05/23/2024. The patient may return to work on 6/6/24 with no restrictions. If you have any questions or concerns, or if I can be of further assistance, please do not hesitate to contact me.    Sincerely,        Abdirahman Valles MD

## 2024-05-23 NOTE — PROGRESS NOTES
"Subjective:       Patient ID: Maricel Sepulveda is a 63 y.o. female.    Chief Complaint: Follow-up (Multiple medical conditions)      HPI  62 yo female with vertigo and other medical conditions.  Diagnosed with otitis media and given antibiotics and steroids.  Dizziness is a little better, but seems to be returning.  Denies falls.  Would like to see ENT.    Review of Systems   Constitutional:  Negative for activity change.   HENT:  Negative for hearing loss and trouble swallowing.    Eyes:  Negative for discharge.   Respiratory:  Negative for chest tightness and wheezing.    Cardiovascular:  Negative for chest pain and palpitations.   Gastrointestinal:  Negative for constipation, diarrhea and vomiting.   Genitourinary:  Negative for difficulty urinating and hematuria.   Neurological:  Negative for headaches.   Psychiatric/Behavioral:  Negative for dysphoric mood.          Objective:       Vital Signs  Temp: 98.2 °F (36.8 °C)  Temp Source: Oral  Pulse: 62  Resp: 18  SpO2: 99 %  BP: 137/78  Height and Weight  Height: 5' 6" (167.6 cm)  Weight: 94.1 kg (207 lb 7.3 oz)  BSA (Calculated - sq m): 2.09 sq meters  BMI (Calculated): 33.5  Weight in (lb) to have BMI = 25: 154.6]  Physical Exam  Constitutional:       Appearance: Normal appearance.   HENT:      Head: Normocephalic and atraumatic.      Right Ear: Ear canal normal.      Left Ear: Ear canal normal.      Ears:      Comments: Bilateral TM are bulging, no exudate or erythema   Eyes:      Extraocular Movements: Extraocular movements intact.      Conjunctiva/sclera: Conjunctivae normal.   Cardiovascular:      Rate and Rhythm: Normal rate and regular rhythm.      Heart sounds: Normal heart sounds.   Pulmonary:      Breath sounds: Normal breath sounds.   Skin:     General: Skin is warm and dry.   Neurological:      General: No focal deficit present.      Mental Status: She is alert.   Psychiatric:         Mood and Affect: Mood and affect normal.         Speech: Speech normal. "         Behavior: Behavior normal. Behavior is cooperative.         Thought Content: Thought content does not include homicidal or suicidal ideation.         Assessment:       Problem List Items Addressed This Visit    None  Visit Diagnoses       Vertigo    -  Primary    Relevant Medications    meclizine (ANTIVERT) 12.5 mg tablet    Other Relevant Orders    Ambulatory referral/consult to ENT    Otitis media, unspecified laterality, unspecified otitis media type        Relevant Orders    Ambulatory referral/consult to ENT    Insomnia, unspecified type        Relevant Medications    traZODone (DESYREL) 50 MG tablet    Bacterial sinusitis        Relevant Medications    amoxicillin-clavulanate 875-125mg (AUGMENTIN) 875-125 mg per tablet            Plan:   Take medication as prescribed  Referral to ENT pending  ER precautions  FU in 3 months

## 2024-06-05 ENCOUNTER — OFFICE VISIT (OUTPATIENT)
Dept: OTOLARYNGOLOGY | Facility: CLINIC | Age: 64
End: 2024-06-05
Payer: COMMERCIAL

## 2024-06-05 VITALS — HEART RATE: 72 BPM | TEMPERATURE: 98 F | DIASTOLIC BLOOD PRESSURE: 72 MMHG | SYSTOLIC BLOOD PRESSURE: 111 MMHG

## 2024-06-05 DIAGNOSIS — H93.19 TINNITUS, UNSPECIFIED LATERALITY: Primary | ICD-10-CM

## 2024-06-05 DIAGNOSIS — H69.93 ETD (EUSTACHIAN TUBE DYSFUNCTION), BILATERAL: ICD-10-CM

## 2024-06-05 DIAGNOSIS — H91.90 HEARING LOSS, UNSPECIFIED HEARING LOSS TYPE, UNSPECIFIED LATERALITY: ICD-10-CM

## 2024-06-05 DIAGNOSIS — R42 DYSEQUILIBRIUM: ICD-10-CM

## 2024-06-05 DIAGNOSIS — R42 DIZZINESS: ICD-10-CM

## 2024-06-05 DIAGNOSIS — J30.9 ALLERGIC RHINITIS, UNSPECIFIED SEASONALITY, UNSPECIFIED TRIGGER: ICD-10-CM

## 2024-06-05 DIAGNOSIS — R42 VERTIGO: ICD-10-CM

## 2024-06-05 PROCEDURE — 99203 OFFICE O/P NEW LOW 30 MIN: CPT | Mod: S$PBB,,, | Performed by: NURSE PRACTITIONER

## 2024-06-05 PROCEDURE — 3074F SYST BP LT 130 MM HG: CPT | Mod: CPTII,,, | Performed by: NURSE PRACTITIONER

## 2024-06-05 PROCEDURE — 3078F DIAST BP <80 MM HG: CPT | Mod: CPTII,,, | Performed by: NURSE PRACTITIONER

## 2024-06-05 PROCEDURE — 1159F MED LIST DOCD IN RCRD: CPT | Mod: CPTII,,, | Performed by: NURSE PRACTITIONER

## 2024-06-05 PROCEDURE — 99215 OFFICE O/P EST HI 40 MIN: CPT | Mod: PBBFAC | Performed by: NURSE PRACTITIONER

## 2024-06-05 RX ORDER — FLUTICASONE PROPIONATE 50 MCG
2 SPRAY, SUSPENSION (ML) NASAL 2 TIMES DAILY
Qty: 16 G | Refills: 11 | Status: SHIPPED | OUTPATIENT
Start: 2024-06-05

## 2024-06-05 NOTE — PROGRESS NOTES
[facility]  Otolaryngology Clinic Note    Maricel Sepulveda  YOB: 1960    Chief Complaint:   Chief Complaint   Patient presents with    referral: Vertigo        HPI: 2024: 63 y.o. female referred for dizziness. Reports this began mid April. Denies spinning; states dizziness is hard to describe. States her head feels heavy and it is difficult to concentrate, almost as if she is in the ocean waves. Describes that she feels as if she has been drinking but does not drink. Reports she sometimes loses her balance when she is walking, can be to either side. Episodes occur daily and last minutes or hours. Denies exacerbation with movement. She does notice symptoms more when she is active and when she is reading. Denies current tinnitus but states she had an episode of tinnitus in March before dizziness began. Endorses frequent sensation of aural fullness and muffled hearing which spontaneously resolves within minutes. Reports frequent rhinitis and maxillary pressure. Denies nasal congestion. Has been using flonase daily for a few weeks. Took allegra in the past but recently switched to claritin. C/o itchy/watery eyes.    ROS:   10-point review of systems negative except per HPI      Review of patient's allergies indicates:  No Known Allergies    Past Medical History:   Diagnosis Date    GERD (gastroesophageal reflux disease)     Hyperparathyroidism     Hypertension     Hypothyroidism     Radiation     Seasonal allergies 2010    Sleep apnea, unspecified        Past Surgical History:   Procedure Laterality Date     SECTION      TONSILLECTOMY  1968    TUBAL LIGATION      WRIST SURGERY Left        Social History     Socioeconomic History    Marital status:    Tobacco Use    Smoking status: Never     Passive exposure: Yes    Smokeless tobacco: Never   Substance and Sexual Activity    Alcohol use: Not Currently    Drug use: Never    Sexual activity: Not Currently      Partners: Male     Birth control/protection: None     Social Determinants of Health     Financial Resource Strain: Low Risk  (2/12/2024)    Overall Financial Resource Strain (CARDIA)     Difficulty of Paying Living Expenses: Not very hard   Food Insecurity: No Food Insecurity (2/12/2024)    Hunger Vital Sign     Worried About Running Out of Food in the Last Year: Never true     Ran Out of Food in the Last Year: Never true   Transportation Needs: No Transportation Needs (2/12/2024)    PRAPARE - Transportation     Lack of Transportation (Medical): No     Lack of Transportation (Non-Medical): No   Physical Activity: Insufficiently Active (2/12/2024)    Exercise Vital Sign     Days of Exercise per Week: 4 days     Minutes of Exercise per Session: 10 min   Stress: Stress Concern Present (2/12/2024)    [employer] [place] [employer] - Occupational Stress Questionnaire     Feeling of Stress : Rather much   Housing Stability: Low Risk  (2/12/2024)    Housing Stability Vital Sign     Unable to Pay for Housing in the Last Year: No     Number of Places Lived in the Last Year: 0     Unstable Housing in the Last Year: No       Family History   Problem Relation Name Age of Onset    Cancer Father Father     Diabetes Father Father        Outpatient Encounter Medications as of 6/5/2024   Medication Sig Dispense Refill    diclofenac sodium (VOLTAREN) 1 % Gel Apply 2 g topically 4 (four) times daily.      estradiol 0.025 mg/24 hr 0.025 mg/24 hr Place 1 patch onto the skin every 7 days.      fluticasone propionate (FLONASE) 50 mcg/actuation nasal spray 2 sprays (100 mcg total) by Each Nostril route once daily. 18.2 mL 0    levothyroxine (SYNTHROID) 88 MCG tablet Take 1 tablet by mouth every morning.      loratadine (CLARITIN) 10 mg tablet Take 10 mg by mouth daily as needed.      meclizine (ANTIVERT) 12.5 mg tablet Take 1 tablet (12.5 mg total) by mouth 3 (three) times daily as needed for Dizziness. 15 tablet 2    meloxicam  (MOBIC) 15 MG tablet Take 1 tablet (15 mg total) by mouth once daily. 30 tablet 1    progesterone (PROMETRIUM) 100 MG capsule Take 1 capsule by mouth once daily.      tacrolimus (PROTOPIC) 0.1 % ointment Apply topically 2 (two) times daily. Apply to hands and feet 2x daily prn for eczema flares up 60 g 5    traZODone (DESYREL) 50 MG tablet Take 1 tablet (50 mg total) by mouth every evening. 30 tablet 5    [] amoxicillin-clavulanate 875-125mg (AUGMENTIN) 875-125 mg per tablet Take 1 tablet by mouth 2 (two) times daily. for 10 days 20 tablet 0    fexofenadine (ALLEGRA) 180 MG tablet Take 1 tablet (180 mg total) by mouth once daily. (Patient not taking: Reported on 2024) 30 tablet 11    progesterone (PROMETRIUM) 100 MG capsule Take 100 mg by mouth every evening. (Patient not taking: Reported on 2024)       No facility-administered encounter medications on file as of 2024.       Physical Exam:  Vitals:    24 1105 24 1109 24 1110   BP: 114/73 121/76 111/72   BP Location: Right arm Right arm Right arm   Patient Position: Lying Sitting Standing   BP Method: Large (Automatic) Large (Automatic) Large (Automatic)   Pulse: (!) 59 (!) 59 72   Temp: 98 °F (36.7 °C)     TempSrc: Oral         Physical Exam   General: NAD, voice normal  Neuro: AAO, CN II - XII grossly intact  Head/ Face: NCAT, symmetric, sensations intact bilaterally  Eyes: EOMI, PERRL  Ears: externally normal with grossly normal hearing  AD: EAC patent, TM intact, no middle ear effusion, no retractions. Able to auto insufflate  AS: EAC patent, TM intact, no middle ear effusion, no retractions. Able to auto insufflate  Nose: bilateral nares patent, midline septum, no rhinorrhea, no external deformity, moderate turbinate hypertrophy  OC/OP: MMM, no intraoral lesions, no trismus, dentition is moderate, no uvular deviation, bilaterally symmetric soft palate elevation, palatoglossus and palatopharyngeal fold wnl; tonsils are  symmetric and 1+  Indirect laryngoscopy: deferred due to patient intolerance  Neck: soft, supple, no LAD, normal ROM, no thyromegaly  Respiratory: nonlabored, no wheezing, bilateral chest rise  Cardiovascular: RRR  Gastrointestinal: S NT ND  Skin: warm, no lesions  Musculoskeletal: 5/5 strength  Psych: Appropriate affect/mood     Pertinent Data:  ? LABS:  ? AUDIO:           ? PATH:      Imaging:   I personally reviewed the following images:        Assessment/Plan:  63 y.o. female with dysequilibrium, transient ETD, AR.  - Audio for baseline + vestibular battery  - Safety precautions  - NSI 1-2x/day  - Increase flonase to BID (reviewed technique)  - Continue claritin  - Discussed trial of OTC antihistamine eye gtts  - RTC 6 wks      Deepa Alexis NP

## 2024-06-13 ENCOUNTER — OFFICE VISIT (OUTPATIENT)
Dept: FAMILY MEDICINE | Facility: CLINIC | Age: 64
End: 2024-06-13
Payer: COMMERCIAL

## 2024-06-13 ENCOUNTER — DOCUMENTATION ONLY (OUTPATIENT)
Dept: AUDIOLOGY | Facility: HOSPITAL | Age: 64
End: 2024-06-13
Payer: COMMERCIAL

## 2024-06-13 ENCOUNTER — PATIENT MESSAGE (OUTPATIENT)
Dept: ADMINISTRATIVE | Facility: HOSPITAL | Age: 64
End: 2024-06-13
Payer: COMMERCIAL

## 2024-06-13 VITALS
TEMPERATURE: 98 F | HEIGHT: 66 IN | BODY MASS INDEX: 34.12 KG/M2 | DIASTOLIC BLOOD PRESSURE: 74 MMHG | SYSTOLIC BLOOD PRESSURE: 122 MMHG | HEART RATE: 61 BPM | OXYGEN SATURATION: 98 % | WEIGHT: 212.31 LBS | RESPIRATION RATE: 18 BRPM

## 2024-06-13 DIAGNOSIS — R42 VERTIGO: Primary | ICD-10-CM

## 2024-06-13 PROCEDURE — 99214 OFFICE O/P EST MOD 30 MIN: CPT | Mod: PBBFAC | Performed by: FAMILY MEDICINE

## 2024-06-13 NOTE — PROGRESS NOTES
Spoke with patient regarding [facility] pre-test protocol and review of current medications. She reportedly takes Meclizine PRN, therefore advised to discontinue at least 48 hours prior to testing.  Also, advised to discontinue Trazodone the night prior to testing, however she endorses limited/occasional use. Mrs. Sepulveda encouraged to eat a light breakfast and avoid stimulants (caffeine/nicotine) at least 30 min prior to testing on 6/27/2024.  She denies a history of otologic procedures.  Mrs. Sepulveda is a good candidate for VNG testing at this time.

## 2024-06-13 NOTE — LETTER
June 13, 2024      [facility] [address] [place] [address]  Phone: [phone]       Patient: Maricel Sepulveda   YOB: 1960  Date of Visit: 06/13/2024    To Whom It May Concern:    Tevin Sepulveda  was at [facility] on 05/23/2024. The patient may return to work on 06/08/2024 with no restrictions. If you have any questions or concerns, or if I can be of further assistance, please do not hesitate to contact me.    Sincerely,        Abdirahman Valles MD

## 2024-06-13 NOTE — PROGRESS NOTES
Patient needs her return to work letter changed. Appointment made in error. Letter reissued and given to patient .

## 2024-06-14 DIAGNOSIS — Z12.11 SCREENING FOR COLON CANCER: ICD-10-CM

## 2024-06-27 ENCOUNTER — OFFICE VISIT (OUTPATIENT)
Dept: URGENT CARE | Facility: CLINIC | Age: 64
End: 2024-06-27
Payer: COMMERCIAL

## 2024-06-27 ENCOUNTER — CLINICAL SUPPORT (OUTPATIENT)
Dept: AUDIOLOGY | Facility: HOSPITAL | Age: 64
End: 2024-06-27
Payer: COMMERCIAL

## 2024-06-27 VITALS
DIASTOLIC BLOOD PRESSURE: 77 MMHG | RESPIRATION RATE: 16 BRPM | TEMPERATURE: 98 F | BODY MASS INDEX: 33.75 KG/M2 | SYSTOLIC BLOOD PRESSURE: 136 MMHG | OXYGEN SATURATION: 97 % | WEIGHT: 210 LBS | HEIGHT: 66 IN | HEART RATE: 71 BPM

## 2024-06-27 DIAGNOSIS — H81.91 DISORDER OF VESTIBULAR FUNCTION OF RIGHT EAR: Primary | ICD-10-CM

## 2024-06-27 DIAGNOSIS — J06.9 UPPER RESPIRATORY TRACT INFECTION, UNSPECIFIED TYPE: Primary | ICD-10-CM

## 2024-06-27 DIAGNOSIS — J30.9 ALLERGIC RHINITIS, UNSPECIFIED SEASONALITY, UNSPECIFIED TRIGGER: ICD-10-CM

## 2024-06-27 DIAGNOSIS — H90.3 SENSORINEURAL HEARING LOSS (SNHL) OF BOTH EARS: ICD-10-CM

## 2024-06-27 DIAGNOSIS — R42 DYSEQUILIBRIUM: ICD-10-CM

## 2024-06-27 DIAGNOSIS — R42 DIZZINESS: ICD-10-CM

## 2024-06-27 DIAGNOSIS — H93.19 TINNITUS, UNSPECIFIED LATERALITY: ICD-10-CM

## 2024-06-27 LAB
CTP QC/QA: YES
CTP QC/QA: YES
MOLECULAR STREP A: NEGATIVE
SARS-COV-2 RDRP RESP QL NAA+PROBE: NEGATIVE

## 2024-06-27 PROCEDURE — 92567 TYMPANOMETRY: CPT | Performed by: AUDIOLOGIST-HEARING AID FITTER

## 2024-06-27 PROCEDURE — 92537 CALORIC VSTBLR TEST W/REC: CPT | Performed by: AUDIOLOGIST-HEARING AID FITTER

## 2024-06-27 PROCEDURE — 87651 STREP A DNA AMP PROBE: CPT | Mod: PBBFAC

## 2024-06-27 PROCEDURE — 99213 OFFICE O/P EST LOW 20 MIN: CPT | Mod: S$PBB,,,

## 2024-06-27 PROCEDURE — 99214 OFFICE O/P EST MOD 30 MIN: CPT | Mod: PBBFAC,25

## 2024-06-27 PROCEDURE — 92557 COMPREHENSIVE HEARING TEST: CPT | Performed by: AUDIOLOGIST-HEARING AID FITTER

## 2024-06-27 PROCEDURE — 87635 SARS-COV-2 COVID-19 AMP PRB: CPT | Mod: PBBFAC

## 2024-06-27 PROCEDURE — 92540 BASIC VESTIBULAR EVALUATION: CPT | Performed by: AUDIOLOGIST-HEARING AID FITTER

## 2024-06-27 RX ORDER — AZELASTINE 1 MG/ML
1 SPRAY, METERED NASAL 2 TIMES DAILY
Qty: 30 EACH | Refills: 3 | Status: SHIPPED | OUTPATIENT
Start: 2024-06-27 | End: 2025-06-27

## 2024-06-27 RX ORDER — PREDNISONE 10 MG/1
TABLET ORAL
Qty: 12 TABLET | Refills: 0 | Status: SHIPPED | OUTPATIENT
Start: 2024-06-27 | End: 2024-07-03

## 2024-06-27 NOTE — LETTER
June 27, 2024      [facility]  [address] [facility] [address]  Phone: [phone]       Patient: Maricel Sepulveda   YOB: 1960  Date of Visit: 06/27/2024    To Whom It May Concern:    Tevin Sepulveda  was at [facility] on 06/27/2024. The patient may return to work/school on 06/29/2024 with no restrictions. If you have any questions or concerns, or if I can be of further assistance, please do not hesitate to contact me.    Sincerely,    MAURIISO Villalpando

## 2024-06-27 NOTE — PROGRESS NOTES
"Subjective:       Patient ID: Maricel Sepulveda is a 63 y.o. female.    Vitals:  height is 5' 6" (1.676 m) and weight is 95.3 kg (210 lb). Her oral temperature is 97.8 °F (36.6 °C). Her blood pressure is 136/77 and her pulse is 71. Her respiration is 16 and oxygen saturation is 97%.     Chief Complaint: URI (Cough, nasal congestion, left ear pain, sinus pressure, itchy eyes, headache and sore throat x 2 days.)    Agree with chief complaint, 63-year-old  female presents to the clinic with .  Denies any known ill contacts.  Compliant with Flonase and Claritin.    URI   Associated symptoms include congestion, coughing, sneezing and vomiting (x 1).       Constitution: Positive for chills. Negative for fever.   HENT:  Positive for congestion, postnasal drip and sinus pressure.    Eyes:  Positive for eye itching.   Respiratory:  Positive for cough.    Gastrointestinal:  Positive for vomiting (x 1).   Musculoskeletal:  Negative for muscle ache.   Allergic/Immunologic: Positive for seasonal allergies and sneezing.       Objective:      Physical Exam   Constitutional: She is oriented to person, place, and time. She is cooperative. She is easily aroused.      Comments:Appears uncomfortable    well-groomedawake  HENT:   Head: Normocephalic and atraumatic.   Nose: Mucosal edema and rhinorrhea present. Right sinus exhibits maxillary sinus tenderness. Left sinus exhibits maxillary sinus tenderness.   Mouth/Throat: Uvula is midline, oropharynx is clear and moist and mucous membranes are normal. She has dentures. Tonsils are 0 on the right. Tonsils are 0 on the left.   Both external canals are injected.       Comments: Both external canals are injected.   Eyes: Lids are normal. Right conjunctiva is injected. Left conjunctiva is injected.   Neck: Neck supple.   Cardiovascular: Normal rate, regular rhythm, S1 normal, S2 normal and normal heart sounds.   Pulmonary/Chest: Effort normal and breath sounds normal. "   Lymphadenopathy:     She has no cervical adenopathy.   Neurological: She is alert, oriented to person, place, and time and easily aroused. Gait normal. GCS eye subscore is 4. GCS verbal subscore is 5. GCS motor subscore is 6.   Skin: Skin is warm, dry and intact. Capillary refill takes less than 2 seconds.   Psychiatric: Her behavior is normal.   Nursing note and vitals reviewed.        Assessment:       1. Upper respiratory tract infection, unspecified type    2. Allergic rhinitis, unspecified seasonality, unspecified trigger          Plan:     May use oral decongestants sparingly for no greater than 7 days. Avoid allergy triggers or irritants, ensure you remain hydrated. Will prescribe short course of steroids for inflammatory symptoms. Denies DM hx. Encouraged to return to clinic or follow up with PCP if symptoms does not improve.     Upper respiratory tract infection, unspecified type  -     POCT Strep A, Molecular  -     POCT COVID-19 Rapid Screening    Allergic rhinitis, unspecified seasonality, unspecified trigger  -     azelastine (ASTELIN) 137 mcg (0.1 %) nasal spray; 1 spray (137 mcg total) by Nasal route 2 (two) times daily.  Dispense: 30 each; Refill: 3    Other orders  -     predniSONE (DELTASONE) 10 MG tablet; Take 2 tablets (20 mg total) by mouth once daily for 2 days, THEN 1 tablet (10 mg total) 2 (two) times daily for 3 days, THEN 0.5 tablets (5 mg total) 2 (two) times daily for 2 days.  Dispense: 12 tablet; Refill: 0           Results for orders placed or performed in visit on 06/27/24   POCT Strep A, Molecular   Result Value Ref Range    Molecular Strep A, POC Negative Negative     Acceptable Yes    POCT COVID-19 Rapid Screening   Result Value Ref Range    POC Rapid COVID Negative Negative     Acceptable Yes

## 2024-06-27 NOTE — PROGRESS NOTES
Audiological/Vestibular Evaluation    Patient is a 63 year old female presenting with symptoms of episodic wooziness/drunk sensation. The initial attack reportedly occurred in April of 2024 and was marked by severe vertigo lasting weeks.  Mrs. Sepulveda denies any associated symptoms such as nausea/vomiting and/or changes in hearing.  To date, the current episodes have improved significantly and are described as a 'drunk sensation' and typically occur while she is in motion or with rapid changes in head turns/bodily position.  The duration of current episodes are typically minutes and do not occur daily.  Mrs. Sepulveda denies a recent change in medication as well as new medical diagnosis at this time.  A change in hearing and/or tinnitus has also been denied.      Pure Tone Testing:     Right ear:   Slight to mild, SNHL    Left ear: Mild, SNHL     Tympanometry:      Right ear:   Type 'A' tympanogram     Left ear: Type 'A' tympanogram (significant, positive ME pressure noted....142 daPa)    DPOAE Testing:    Right ear: Present emissions 9852-8936 Hz    Left ear:  Absent emissions noted 2884-7333 Hz with the exception of present emissions at 3000 Hz only        Vestibular Results:    Spontaneous: Left beat nystagmus observed for sitting position with vision denied.  Fixation ability was noted.   Gaze:   No gaze-evoked nystagmus.    Saccades:   Abnormal saccade velocity, latency and accuracy.   Tracking: Normal tracking accuracy and gain.  OPK:    No asymmetry noted.   Positionals: Left beat nystagmus noted for all test conditions with vision denied.  Positioning: No rotary nystagmus noted for DHP maneuver head right/left, however left beat nystagmus noted for DHP left.   Calorics: Within normal limits (results may have been affected by the presence of spontaneous nystagmus)    Caloric vestibular responses: RC 15, RW 13, LW 16,  LC 23 deg/s.        Interpretations:    Pure tone testing revealed a slight to mild,  sensorineural hearing loss for the right ear.  Testing for the left ear revealed a mild, sensorineural hearing loss.  Speech reception thresholds were obtained at 20 dB HL (right ear) and 30 dB HL (left ear) consistent with pure tone testing.  Word recognition scores were excellent, bilaterally. Tympanometry testing revealed Type A tympanograms, bilaterally, indicative of normal middle ear function, however significant, positive ME pressure noted for the left ear.  DPOAE testing revealed present emissions for the frequencies noted indicative of normal cochlear physiology for the right ear; predominantly absent emissions noted for the left ear.  Otoscopy revealed clear EACs, bilaterally.     This patient's videonystagmogram was abnormal.  Oculomotor testing was essentially within normal limits with the exception of an abnormal finding for saccadic testing. Positional testing revealed left beat nystagmus for all test conditions with vision denied. Positioning testing did not reveal any type of nystagmus and therefore, negative for canalith involvement, however left beat nystagmus noted for DHP head left. The caloric irrigation test revealed symmetrical responses for air irrigations, bilaterally.  Todays findings indicative of a right peripheral dysfunction.    Recommendations:        Return to referring ENT for further follow up  Perform at home, self-monitored VR exercises to assist with compensatory strategies       Melissa Humphrey.  Clinical Audiologist

## 2024-07-03 NOTE — PROGRESS NOTES
[facility]  Otolaryngology Clinic Note    Maricel Sepulveda  YOB: 1960    Chief Complaint:   Chief Complaint   Patient presents with    referral: Vertigo        HPI: 06/05/2024: 63 y.o. female referred for dizziness. Reports this began mid April. Denies spinning; states dizziness is hard to describe. States her head feels heavy and it is difficult to concentrate, almost as if she is in the ocean waves. Describes that she feels as if she has been drinking but does not drink. Reports she sometimes loses her balance when she is walking, can be to either side. Episodes occur daily and last minutes or hours. Denies exacerbation with movement. She does notice symptoms more when she is active and when she is reading. Denies current tinnitus but states she had an episode of tinnitus in March before dizziness began. Endorses frequent sensation of aural fullness and muffled hearing which spontaneously resolves within minutes. Reports frequent rhinitis and maxillary pressure. Denies nasal congestion. Has been using flonase daily for a few weeks. Took allegra in the past but recently switched to claritin. C/o itchy/watery eyes.    7/8/24: F/u after vestibu;ar testing. States dizzines has not resolved but has been less frequent and severe. States her insurance will not cover vestibular therapy. She has been performing vestibular exercises at home. Main c/o today is rhinorrhea and globus. States she was recently seen in [facility] for URI and given astelin + a steroid taper. Endorses use of flonase + astelin BID. States she has sinus rinse kit but forgot to try it. Requesting a refill on claritin. She endorses uncontrolled reflux for which she has occasionally been taking her 's medicine. She used to have a prescription but states it was not renewed. She has a visit with her PCP tomorrow & plans to discuss this.    ROS:   10-point review of systems negative except per HPI      Review of patient's  allergies indicates:  No Known Allergies    Past Medical History:   Diagnosis Date    GERD (gastroesophageal reflux disease)     Hyperparathyroidism     Hypertension     Hypothyroidism     Radiation     Seasonal allergies 2010    Sleep apnea, unspecified        Past Surgical History:   Procedure Laterality Date     SECTION      TONSILLECTOMY  1968    TUBAL LIGATION      WRIST SURGERY Left        Social History     Socioeconomic History    Marital status:    Tobacco Use    Smoking status: Never     Passive exposure: Yes    Smokeless tobacco: Never   Substance and Sexual Activity    Alcohol use: Not Currently    Drug use: Never    Sexual activity: Not Currently     Partners: Male     Birth control/protection: None     Social Determinants of Health     Financial Resource Strain: Low Risk  (2024)    Overall Financial Resource Strain (CARDIA)     Difficulty of Paying Living Expenses: Not very hard   Food Insecurity: No Food Insecurity (2024)    Hunger Vital Sign     Worried About Running Out of Food in the Last Year: Never true     Ran Out of Food in the Last Year: Never true   Transportation Needs: No Transportation Needs (2024)    PRAPARE - Transportation     Lack of Transportation (Medical): No     Lack of Transportation (Non-Medical): No   Physical Activity: Insufficiently Active (2024)    Exercise Vital Sign     Days of Exercise per Week: 4 days     Minutes of Exercise per Session: 10 min   Stress: Stress Concern Present (2024)    [place] [employer] - Occupational Stress Questionnaire     Feeling of Stress : Rather much   Housing Stability: Low Risk  (2024)    Housing Stability Vital Sign     Unable to Pay for Housing in the Last Year: No     Number of Places Lived in the Last Year: 0     Unstable Housing in the Last Year: No       Family History   Problem Relation Name Age of Onset    Cancer Father Father     Diabetes Father Father         Outpatient Encounter Medications as of 2024   Medication Sig Dispense Refill    diclofenac sodium (VOLTAREN) 1 % Gel Apply 2 g topically 4 (four) times daily.      estradiol 0.025 mg/24 hr 0.025 mg/24 hr Place 1 patch onto the skin every 7 days.      fluticasone propionate (FLONASE) 50 mcg/actuation nasal spray 2 sprays (100 mcg total) by Each Nostril route once daily. 18.2 mL 0    levothyroxine (SYNTHROID) 88 MCG tablet Take 1 tablet by mouth every morning.      loratadine (CLARITIN) 10 mg tablet Take 10 mg by mouth daily as needed.      meclizine (ANTIVERT) 12.5 mg tablet Take 1 tablet (12.5 mg total) by mouth 3 (three) times daily as needed for Dizziness. 15 tablet 2    meloxicam (MOBIC) 15 MG tablet Take 1 tablet (15 mg total) by mouth once daily. 30 tablet 1    progesterone (PROMETRIUM) 100 MG capsule Take 1 capsule by mouth once daily.      tacrolimus (PROTOPIC) 0.1 % ointment Apply topically 2 (two) times daily. Apply to hands and feet 2x daily prn for eczema flares up 60 g 5    traZODone (DESYREL) 50 MG tablet Take 1 tablet (50 mg total) by mouth every evening. 30 tablet 5    [] amoxicillin-clavulanate 875-125mg (AUGMENTIN) 875-125 mg per tablet Take 1 tablet by mouth 2 (two) times daily. for 10 days 20 tablet 0    fexofenadine (ALLEGRA) 180 MG tablet Take 1 tablet (180 mg total) by mouth once daily. (Patient not taking: Reported on 2024) 30 tablet 11    progesterone (PROMETRIUM) 100 MG capsule Take 100 mg by mouth every evening. (Patient not taking: Reported on 2024)       No facility-administered encounter medications on file as of 2024.       Physical Exam:  Vitals:    24 1105 24 1109 24 1110   BP: 114/73 121/76 111/72   BP Location: Right arm Right arm Right arm   Patient Position: Lying Sitting Standing   BP Method: Large (Automatic) Large (Automatic) Large (Automatic)   Pulse: (!) 59 (!) 59 72   Temp: 98 °F (36.7 °C)     TempSrc: Oral         Physical  Exam   General: NAD, voice normal  Neuro: AAO, CN II - XII grossly intact  Head/ Face: NCAT, symmetric, sensations intact bilaterally  Eyes: EOMI, PERRL  Ears: externally normal with grossly normal hearing  AD: EAC patent, TM intact, no middle ear effusion, no retractions. Able to auto insufflate  AS: EAC patent, TM intact, no middle ear effusion, no retractions. Able to auto insufflate  Nose: bilateral nares patent, midline septum, no rhinorrhea, no external deformity, moderate turbinate hypertrophy  OC/OP: MMM, no intraoral lesions, no trismus, dentition is moderate, no uvular deviation, bilaterally symmetric soft palate elevation, palatoglossus and palatopharyngeal fold wnl; tonsils are symmetric and 1+  Indirect laryngoscopy: deferred due to patient intolerance  Neck: soft, supple, no LAD, normal ROM, no thyromegaly  Respiratory: nonlabored, no wheezing, bilateral chest rise  Cardiovascular: RRR  Gastrointestinal: S NT ND  Skin: warm, no lesions  Musculoskeletal: 5/5 strength  Psych: Appropriate affect/mood     Pertinent Data:  ? LABS:  ? AUDIO:             ? PATH:      Imaging:   I personally reviewed the following images:        Assessment/Plan:  63 y.o. female with dysequilibrium, transient ETD, AR, GERD/globus. Audio with mild b/l SNHL, type A tymps. VNG with right peripheral weakness.  - Annual audio  - Continue home vestibular exercises  - Safety precautions  - NSI 1-2x/day  - Flonase + astelin BID (reviewed technique)  - Continue claritin  - GERD lifestyle modifications  - Discuss GERD med mgmt with PCP tomorrow as planned  - RTC 3mo      Deepa Alexis NP

## 2024-07-08 ENCOUNTER — OFFICE VISIT (OUTPATIENT)
Dept: OTOLARYNGOLOGY | Facility: CLINIC | Age: 64
End: 2024-07-08
Payer: COMMERCIAL

## 2024-07-08 VITALS — DIASTOLIC BLOOD PRESSURE: 61 MMHG | TEMPERATURE: 98 F | HEART RATE: 53 BPM | SYSTOLIC BLOOD PRESSURE: 129 MMHG

## 2024-07-08 DIAGNOSIS — H90.3 SENSORINEURAL HEARING LOSS (SNHL) OF BOTH EARS: Primary | ICD-10-CM

## 2024-07-08 PROCEDURE — 99214 OFFICE O/P EST MOD 30 MIN: CPT | Mod: PBBFAC | Performed by: NURSE PRACTITIONER

## 2024-07-08 PROCEDURE — 3074F SYST BP LT 130 MM HG: CPT | Mod: CPTII,,, | Performed by: NURSE PRACTITIONER

## 2024-07-08 PROCEDURE — 1159F MED LIST DOCD IN RCRD: CPT | Mod: CPTII,,, | Performed by: NURSE PRACTITIONER

## 2024-07-08 PROCEDURE — 99213 OFFICE O/P EST LOW 20 MIN: CPT | Mod: S$PBB,,, | Performed by: NURSE PRACTITIONER

## 2024-07-08 PROCEDURE — 3078F DIAST BP <80 MM HG: CPT | Mod: CPTII,,, | Performed by: NURSE PRACTITIONER

## 2024-07-08 RX ORDER — LORATADINE 10 MG/1
10 TABLET ORAL DAILY
Qty: 90 TABLET | Refills: 3 | Status: SHIPPED | OUTPATIENT
Start: 2024-07-08

## 2024-08-29 ENCOUNTER — OFFICE VISIT (OUTPATIENT)
Dept: FAMILY MEDICINE | Facility: CLINIC | Age: 64
End: 2024-08-29
Payer: COMMERCIAL

## 2024-08-29 VITALS
TEMPERATURE: 98 F | HEIGHT: 66 IN | WEIGHT: 215.19 LBS | BODY MASS INDEX: 34.58 KG/M2 | RESPIRATION RATE: 18 BRPM | SYSTOLIC BLOOD PRESSURE: 121 MMHG | HEART RATE: 62 BPM | OXYGEN SATURATION: 96 % | DIASTOLIC BLOOD PRESSURE: 75 MMHG

## 2024-08-29 DIAGNOSIS — M47.9 ARTHRITIS OF BACK: Primary | ICD-10-CM

## 2024-08-29 PROCEDURE — 99214 OFFICE O/P EST MOD 30 MIN: CPT | Mod: PBBFAC | Performed by: FAMILY MEDICINE

## 2024-08-29 RX ORDER — DICLOFENAC SODIUM 50 MG/1
50 TABLET, DELAYED RELEASE ORAL 3 TIMES DAILY
Qty: 90 TABLET | Refills: 2 | Status: SHIPPED | OUTPATIENT
Start: 2024-08-29

## 2024-08-29 NOTE — PROGRESS NOTES
"Subjective:       Patient ID: Maricel Sepulveda is a 64 y.o. female.    Chief Complaint: Follow-up (Routine visit. Wants to discuss changing pain medication.)      Follow-up  Pertinent negatives include no chest pain, headaches or vomiting.     65 yo female with arthritis and pain in multiple joints.  Knees and back are the most painful.  Would like to change from meloxicam.  No other current or recent issues. We will get labs at next visit.   Review of Systems   Constitutional:  Negative for activity change.   HENT:  Negative for hearing loss and trouble swallowing.    Eyes:  Negative for discharge.   Respiratory:  Negative for chest tightness and wheezing.    Cardiovascular:  Negative for chest pain and palpitations.   Gastrointestinal:  Negative for constipation, diarrhea and vomiting.   Genitourinary:  Negative for difficulty urinating and hematuria.   Neurological:  Negative for headaches.   Psychiatric/Behavioral:  Negative for dysphoric mood.          Objective:       Vital Signs  Temp: 97.9 °F (36.6 °C)  Temp Source: Oral  Pulse: 62  Resp: 18  SpO2: 96 %  BP: 121/75  BP Location: Right arm  Patient Position: Sitting  Height and Weight  Height: 5' 6" (167.6 cm)  Weight: 97.6 kg (215 lb 2.7 oz)  BSA (Calculated - sq m): 2.13 sq meters  BMI (Calculated): 34.7  Weight in (lb) to have BMI = 25: 154.6]  Physical Exam  Vitals reviewed.   Constitutional:       Appearance: Normal appearance.   HENT:      Head: Normocephalic and atraumatic.   Eyes:      Extraocular Movements: Extraocular movements intact.      Conjunctiva/sclera: Conjunctivae normal.   Cardiovascular:      Rate and Rhythm: Normal rate and regular rhythm.      Heart sounds: Normal heart sounds.   Pulmonary:      Breath sounds: Normal breath sounds.   Skin:     General: Skin is warm and dry.   Neurological:      General: No focal deficit present.      Mental Status: She is alert.   Psychiatric:         Mood and Affect: Mood and affect normal.         " Speech: Speech normal.         Behavior: Behavior normal. Behavior is cooperative.         Thought Content: Thought content does not include homicidal or suicidal ideation.         Assessment:       Problem List Items Addressed This Visit    None  Visit Diagnoses       Arthritis of back    -  Primary    Relevant Medications    diclofenac (VOLTAREN) 50 MG EC tablet            Plan:   Encouraged warm/cold compresses and acetaminophen as needed  ER precautions  Follow-up in 3 months

## 2024-09-19 ENCOUNTER — OFFICE VISIT (OUTPATIENT)
Dept: FAMILY MEDICINE | Facility: CLINIC | Age: 64
End: 2024-09-19
Payer: COMMERCIAL

## 2024-09-19 VITALS
OXYGEN SATURATION: 98 % | BODY MASS INDEX: 35.22 KG/M2 | DIASTOLIC BLOOD PRESSURE: 77 MMHG | WEIGHT: 219.13 LBS | SYSTOLIC BLOOD PRESSURE: 129 MMHG | HEIGHT: 66 IN | TEMPERATURE: 98 F | HEART RATE: 63 BPM | RESPIRATION RATE: 18 BRPM

## 2024-09-19 DIAGNOSIS — F41.9 ANXIETY: ICD-10-CM

## 2024-09-19 DIAGNOSIS — E03.9 HYPOTHYROIDISM, UNSPECIFIED TYPE: Primary | ICD-10-CM

## 2024-09-19 PROCEDURE — 99214 OFFICE O/P EST MOD 30 MIN: CPT | Mod: PBBFAC | Performed by: FAMILY MEDICINE

## 2024-09-19 RX ORDER — BUSPIRONE HYDROCHLORIDE 5 MG/1
5 TABLET ORAL 2 TIMES DAILY
Qty: 60 TABLET | Refills: 2 | Status: SHIPPED | OUTPATIENT
Start: 2024-09-19 | End: 2025-09-19

## 2024-09-19 RX ORDER — FLUTICASONE PROPIONATE 50 MCG
2 SPRAY, SUSPENSION (ML) NASAL 2 TIMES DAILY
Qty: 16 G | Refills: 11 | Status: SHIPPED | OUTPATIENT
Start: 2024-09-19 | End: 2024-09-19 | Stop reason: SDUPTHER

## 2024-09-19 RX ORDER — FLUTICASONE PROPIONATE 50 MCG
2 SPRAY, SUSPENSION (ML) NASAL 2 TIMES DAILY
Qty: 16 G | Refills: 11 | Status: SHIPPED | OUTPATIENT
Start: 2024-09-19

## 2024-09-19 NOTE — PROGRESS NOTES
"Subjective:       Patient ID: Maricel Sepulveda is a 64 y.o. female.    Chief Complaint: Follow-up (Multiple medical conditons) and Medication Refill      Follow-up  Pertinent negatives include no chest pain, headaches or vomiting.   Medication Refill  Pertinent negatives include no chest pain, headaches or vomiting.     63 yo female with multiple medical diagnoses.  Anxiety has been poorly controlled on hydroxyzine.  She is willing to try buspirone. Needs refill on fluticasone nasal.    Review of Systems   Constitutional:  Negative for activity change.   HENT:  Negative for hearing loss and trouble swallowing.    Eyes:  Negative for discharge.   Respiratory:  Negative for chest tightness and wheezing.    Cardiovascular:  Negative for chest pain and palpitations.   Gastrointestinal:  Negative for constipation, diarrhea and vomiting.   Genitourinary:  Negative for difficulty urinating and hematuria.   Neurological:  Negative for headaches.   Psychiatric/Behavioral:  Negative for dysphoric mood.          Objective:       Vital Signs  Temp: 97.7 °F (36.5 °C)  Temp Source: Oral  Pulse: 63  Resp: 18  SpO2: 98 %  BP: 129/77  BP Location: Left arm  Patient Position: Sitting  Height and Weight  Height: 5' 6" (167.6 cm)  Weight: 99.4 kg (219 lb 2.2 oz)  BSA (Calculated - sq m): 2.15 sq meters  BMI (Calculated): 35.4  Weight in (lb) to have BMI = 25: 154.6]  Physical Exam  Vitals reviewed.   Constitutional:       Appearance: Normal appearance.   HENT:      Head: Normocephalic and atraumatic.   Eyes:      Extraocular Movements: Extraocular movements intact.      Conjunctiva/sclera: Conjunctivae normal.   Cardiovascular:      Rate and Rhythm: Normal rate and regular rhythm.      Heart sounds: Normal heart sounds.   Pulmonary:      Breath sounds: Normal breath sounds.   Skin:     General: Skin is warm and dry.   Neurological:      General: No focal deficit present.      Mental Status: She is alert.   Psychiatric:         Mood and " Affect: Mood and affect normal.         Speech: Speech normal.         Behavior: Behavior normal. Behavior is cooperative.         Thought Content: Thought content does not include homicidal or suicidal ideation.         Assessment:       Problem List Items Addressed This Visit          Psychiatric    Anxiety    Relevant Medications    busPIRone (BUSPAR) 5 MG Tab    fluticasone propionate (FLONASE) 50 mcg/actuation nasal spray       Endocrine    Hypothyroidism - Primary       Plan:   Will try buspirone  Labs at next visit  ER precautions  FU in 3 months

## 2024-10-11 ENCOUNTER — OFFICE VISIT (OUTPATIENT)
Dept: OTOLARYNGOLOGY | Facility: CLINIC | Age: 64
End: 2024-10-11
Payer: COMMERCIAL

## 2024-10-11 DIAGNOSIS — R42 DIZZINESS: Primary | ICD-10-CM

## 2024-10-11 DIAGNOSIS — H90.3 SENSORINEURAL HEARING LOSS (SNHL) OF BOTH EARS: ICD-10-CM

## 2024-10-11 DIAGNOSIS — F41.9 ANXIETY: ICD-10-CM

## 2024-10-11 DIAGNOSIS — J30.9 ALLERGIC RHINITIS, UNSPECIFIED SEASONALITY, UNSPECIFIED TRIGGER: ICD-10-CM

## 2024-10-11 DIAGNOSIS — R42 DYSEQUILIBRIUM: ICD-10-CM

## 2024-10-11 RX ORDER — AZELASTINE 1 MG/ML
1 SPRAY, METERED NASAL 2 TIMES DAILY
Qty: 30 ML | Refills: 3 | Status: SHIPPED | OUTPATIENT
Start: 2024-10-11 | End: 2025-10-11

## 2024-10-11 RX ORDER — FLUTICASONE PROPIONATE 50 MCG
2 SPRAY, SUSPENSION (ML) NASAL 2 TIMES DAILY
Qty: 48 G | Refills: 3 | Status: SHIPPED | OUTPATIENT
Start: 2024-10-11

## 2024-10-11 NOTE — PROGRESS NOTES
Audio Only Telehealth Visit     The patient location is: [facility]  The chief complaint leading to consultation is: f/u  Visit type: Virtual visit with audio only (telephone)  Total time spent on visit: 20 min     The reason for the audio only service rather than synchronous audio and video virtual visit was related to technical difficulties or patient preference/necessity.     Each patient to whom I provide medical services by telemedicine is:  (1) informed of the relationship between the physician and patient and the respective role of any other health care provider with respect to management of the patient; and (2) notified that they may decline to receive medical services by telemedicine and may withdraw from such care at any time. Patient verbally consented to receive this service via voice-only telephone call.       HPI: 06/05/2024: 63 y.o. female referred for dizziness. Reports this began mid April. Denies spinning; states dizziness is hard to describe. States her head feels heavy and it is difficult to concentrate, almost as if she is in the ocean waves. Describes that she feels as if she has been drinking but does not drink. Reports she sometimes loses her balance when she is walking, can be to either side. Episodes occur daily and last minutes or hours. Denies exacerbation with movement. She does notice symptoms more when she is active and when she is reading. Denies current tinnitus but states she had an episode of tinnitus in March before dizziness began. Endorses frequent sensation of aural fullness and muffled hearing which spontaneously resolves within minutes. Reports frequent rhinitis and maxillary pressure. Denies nasal congestion. Has been using flonase daily for a few weeks. Took allegra in the past but recently switched to claritin. C/o itchy/watery eyes.    7/8/24: F/u after vestibular testing. States dizzines has not resolved but has been less frequent and severe. States her insurance will  not cover vestibular therapy. She has been performing vestibular exercises at home. Main c/o today is rhinorrhea and globus. States she was recently seen in [facility] [place] and given astelin + a steroid taper. Endorses use of flonase + astelin BID. States she has sinus rinse kit but forgot to try it. Requesting a refill on claritin. She endorses uncontrolled reflux for which she has occasionally been taking her 's medicine. She used to have a prescription but states it was not renewed. She has a visit with her PCP tomorrow & plans to discuss this.    10/11/2024: Feels exercises have helped significantly with dizziness; states she has not had a bad episode in quite some time. Nasal symptoms are well controlled with sprays and claritin. PCP placed her back on PPI which she is taking prn for reflux.    Audio:       Assessment and plan:  64 y.o. female with dysequilibrium, transient ETD, AR, GERD/globus. Audio with mild b/l SNHL, type A tymps. VNG with right peripheral weakness.  - Annual audio  - Continue home vestibular exercises  - Safety precautions  - NSI 1-2x/day  - Flonase + astelin BID (reviewed technique)  - Continue claritin  - RTC 1yr      Deeap Alexis NP      This service was not originating from a related E/M service provided within the previous 7 days nor will  to an E/M service or procedure within the next 24 hours or my soonest available appointment.  Prevailing standard of care was able to be met in this audio-only visit.

## 2024-10-31 ENCOUNTER — OFFICE VISIT (OUTPATIENT)
Dept: URGENT CARE | Facility: CLINIC | Age: 64
End: 2024-10-31
Payer: COMMERCIAL

## 2024-10-31 VITALS
RESPIRATION RATE: 18 BRPM | OXYGEN SATURATION: 100 % | WEIGHT: 217 LBS | TEMPERATURE: 98 F | DIASTOLIC BLOOD PRESSURE: 82 MMHG | BODY MASS INDEX: 34.87 KG/M2 | SYSTOLIC BLOOD PRESSURE: 123 MMHG | HEART RATE: 69 BPM | HEIGHT: 66 IN

## 2024-10-31 DIAGNOSIS — J06.9 ACUTE URI: Primary | ICD-10-CM

## 2024-10-31 DIAGNOSIS — R05.9 COUGH IN ADULT: ICD-10-CM

## 2024-10-31 DIAGNOSIS — B30.9 VIRAL CONJUNCTIVITIS: ICD-10-CM

## 2024-10-31 PROCEDURE — 99213 OFFICE O/P EST LOW 20 MIN: CPT | Mod: S$PBB,,,

## 2024-10-31 PROCEDURE — 99215 OFFICE O/P EST HI 40 MIN: CPT | Mod: PBBFAC

## 2024-10-31 RX ORDER — OLOPATADINE HYDROCHLORIDE 1 MG/ML
1 SOLUTION/ DROPS OPHTHALMIC 2 TIMES DAILY
Qty: 5 ML | Refills: 0 | Status: SHIPPED | OUTPATIENT
Start: 2024-10-31

## 2024-10-31 RX ORDER — BENZONATATE 200 MG/1
200 CAPSULE ORAL 3 TIMES DAILY PRN
Qty: 30 CAPSULE | Refills: 0 | Status: SHIPPED | OUTPATIENT
Start: 2024-10-31 | End: 2024-11-10

## 2024-11-04 ENCOUNTER — OFFICE VISIT (OUTPATIENT)
Dept: URGENT CARE | Facility: CLINIC | Age: 64
End: 2024-11-04
Payer: COMMERCIAL

## 2024-11-04 ENCOUNTER — HOSPITAL ENCOUNTER (OUTPATIENT)
Dept: RADIOLOGY | Facility: HOSPITAL | Age: 64
Discharge: HOME | End: 2024-11-04
Payer: COMMERCIAL

## 2024-11-04 VITALS
TEMPERATURE: 98 F | SYSTOLIC BLOOD PRESSURE: 132 MMHG | HEART RATE: 65 BPM | RESPIRATION RATE: 20 BRPM | WEIGHT: 212.69 LBS | BODY MASS INDEX: 34.18 KG/M2 | OXYGEN SATURATION: 99 % | HEIGHT: 66 IN | DIASTOLIC BLOOD PRESSURE: 81 MMHG

## 2024-11-04 DIAGNOSIS — R05.2 SUBACUTE COUGH: ICD-10-CM

## 2024-11-04 DIAGNOSIS — R05.9 COUGH IN ADULT PATIENT: Primary | ICD-10-CM

## 2024-11-04 DIAGNOSIS — R09.89 SYMPTOMS OF UPPER RESPIRATORY INFECTION (URI): ICD-10-CM

## 2024-11-04 LAB
CTP QC/QA: YES
SARS-COV-2 AG RESP QL IA.RAPID: NEGATIVE

## 2024-11-04 PROCEDURE — 71046 X-RAY EXAM CHEST 2 VIEWS: CPT | Mod: TC

## 2024-11-04 PROCEDURE — 63600175 PHARM REV CODE 636 W HCPCS

## 2024-11-04 PROCEDURE — 99213 OFFICE O/P EST LOW 20 MIN: CPT | Mod: S$PBB,,,

## 2024-11-04 PROCEDURE — 99215 OFFICE O/P EST HI 40 MIN: CPT | Mod: PBBFAC,25

## 2024-11-04 PROCEDURE — 87811 SARS-COV-2 COVID19 W/OPTIC: CPT | Mod: PBBFAC

## 2024-11-04 RX ORDER — AZITHROMYCIN 250 MG/1
TABLET, FILM COATED ORAL
Qty: 6 TABLET | Refills: 0 | Status: SHIPPED | OUTPATIENT
Start: 2024-11-04 | End: 2024-11-09

## 2024-11-04 RX ORDER — ALBUTEROL SULFATE 0.83 MG/ML
2.5 SOLUTION RESPIRATORY (INHALATION) EVERY 6 HOURS PRN
Qty: 90 EACH | Refills: 0 | Status: SHIPPED | OUTPATIENT
Start: 2024-11-04 | End: 2025-11-04

## 2024-11-04 RX ORDER — METHYLPREDNISOLONE SOD SUCC 125 MG
125 VIAL (EA) INJECTION ONCE
Status: COMPLETED | OUTPATIENT
Start: 2024-11-04 | End: 2024-11-04

## 2024-11-04 RX ADMIN — METHYLPREDNISOLONE SODIUM SUCCINATE 125 MG: 125 INJECTION, POWDER, FOR SOLUTION INTRAMUSCULAR; INTRAVENOUS at 12:11

## 2024-11-04 NOTE — LETTER
November 4, 2024      [facility]  [address] [address]  Phone: [phone]       Patient: Maricel Sepulveda   YOB: 1960  Date of Visit: 11/04/2024    To Whom It May Concern:    Tevin Sepulveda  was at [facility] on 11/04/2024. The patient may return to work/school on 11/08/2024 with no restrictions. If you have any questions or concerns, or if I can be of further assistance, please do not hesitate to contact me.    Sincerely,    MAURISIO Villalpando

## 2024-11-04 NOTE — PROGRESS NOTES
"Subjective:       Patient ID: Maricel Sepulveda is a 64 y.o. female.    Vitals:  height is 5' 5.5" (1.664 m) and weight is 96.5 kg (212 lb 11.2 oz). Her temperature is 97.9 °F (36.6 °C). Her blood pressure is 132/81 and her pulse is 65. Her respiration is 20 and oxygen saturation is 99%.     Chief Complaint: Follow-up (F/U, seen here 10/31. States symptoms are not improving. Appt with PCP this Thursday, 11/7.)    65 y/o AAF return to clinic, states coughing symptoms are worsening. Has taken Tessalon with no sig improvement. States symptoms are interfering with work duties.         Constitution: Negative for fever.   HENT:  Positive for congestion and sore throat.    Respiratory:  Positive for cough, sputum production (yellow) and shortness of breath (mild). Negative for wheezing and asthma.    Gastrointestinal:  Positive for vomiting. Negative for abdominal pain.   Allergic/Immunologic: Negative for asthma.   Neurological:  Negative for dizziness and headaches.       Objective:      Physical Exam   Constitutional: She is oriented to person, place, and time. She appears well-developed. She is cooperative. She is easily aroused. She appears ill. No distress. awake  HENT:   Head: Normocephalic and atraumatic.   Ears:   Right Ear: Tympanic membrane and ear canal normal.   Left Ear: Tympanic membrane and ear canal normal.   Nose: Mucosal edema and rhinorrhea present.   Mouth/Throat: Uvula is midline, oropharynx is clear and moist and mucous membranes are normal.   Eyes: Left conjunctiva is injected.   Neck: Neck supple.   Cardiovascular: Normal rate.   Pulmonary/Chest: Effort normal and breath sounds normal. She exhibits no tenderness.   Abdominal: Normal appearance.   Neurological: She is alert, oriented to person, place, and time and easily aroused. Gait normal. GCS eye subscore is 4. GCS verbal subscore is 5. GCS motor subscore is 6.   Skin: Skin is warm, dry and intact. Capillary refill takes less than 2 seconds. "   Psychiatric: Her speech is normal and behavior is normal.   Nursing note and vitals reviewed.        Assessment:       1. Cough in adult patient    2. Symptoms of upper respiratory infection (URI)          Plan:   Patient appears ill, symptoms are worsening will give Z-Pack, Encouraged to continue with Tessalon for cough symptoms, increase oral fluids, avoid any known congestion triggers or irritants.  Keep your scheduled appointment with Dr. Valles this week.  Strict ER precautions discussed.        XR CHEST PA AND LATERAL  Order: [id number]  Status: Final result       Visible to patient: Yes (not seen)       Next appt: 11/07/2024 at 12:45 PM in Family Medicine (Abdirahman Valles MD)       Dx: Subacute cough    0 Result Notes  Details    Reading Physician Reading Date Result Priority   Albino Reyes MD  [phone] 11/4/2024 STAT     Narrative & Impression  EXAMINATION:  XR CHEST PA AND LATERAL     CLINICAL HISTORY:  Subacute cough     TECHNIQUE:  Two views     COMPARISON:  January 10, 2023.     FINDINGS:  Cardiopericardial silhouette is within normal limits.  No acute dense focal or segmental consolidation, congestion, pleural effusion or pneumothorax.     Impression:     No acute cardiopulmonary process identified.        Electronically signed by:Albino Reyes  Date:                                            11/04/2024  Time:                                           12:10       Cough in adult patient  -     XR CHEST PA AND LATERAL; Future; Expected date: 11/04/2024  -     methylPREDNISolone sodium succinate injection 125 mg  -     azithromycin (Z-MAY) 250 MG tablet; Take 2 tablets by mouth on day 1; Take 1 tablet by mouth on days 2-5  Dispense: 6 tablet; Refill: 0  -     albuterol (PROVENTIL) 2.5 mg /3 mL (0.083 %) nebulizer solution; Take 3 mLs (2.5 mg total) by nebulization every 6 (six) hours as needed for Wheezing. Rescue  Dispense: 90 each; Refill: 0    Symptoms of upper respiratory infection (URI)  -      SARS Coronavirus 2 Antigen, POCT Manual Read           Results for orders placed or performed in visit on 11/04/24   SARS Coronavirus 2 Antigen, POCT Manual Read    Collection Time: 11/04/24 11:43 AM   Result Value Ref Range    SARS Coronavirus 2 Antigen Negative Negative     Acceptable Yes

## 2024-11-07 ENCOUNTER — OFFICE VISIT (OUTPATIENT)
Dept: FAMILY MEDICINE | Facility: CLINIC | Age: 64
End: 2024-11-07
Payer: COMMERCIAL

## 2024-11-07 VITALS
OXYGEN SATURATION: 99 % | HEIGHT: 65 IN | BODY MASS INDEX: 35.29 KG/M2 | HEART RATE: 69 BPM | WEIGHT: 211.81 LBS | DIASTOLIC BLOOD PRESSURE: 75 MMHG | RESPIRATION RATE: 22 BRPM | SYSTOLIC BLOOD PRESSURE: 125 MMHG | TEMPERATURE: 98 F

## 2024-11-07 DIAGNOSIS — B96.89 BACTERIAL SINUSITIS: Primary | ICD-10-CM

## 2024-11-07 DIAGNOSIS — J32.9 BACTERIAL SINUSITIS: Primary | ICD-10-CM

## 2024-11-07 PROCEDURE — 99213 OFFICE O/P EST LOW 20 MIN: CPT | Mod: PBBFAC | Performed by: FAMILY MEDICINE

## 2024-11-07 RX ORDER — AMOXICILLIN AND CLAVULANATE POTASSIUM 875; 125 MG/1; MG/1
1 TABLET, FILM COATED ORAL 2 TIMES DAILY
Qty: 20 TABLET | Refills: 0 | Status: SHIPPED | OUTPATIENT
Start: 2024-11-07 | End: 2024-11-17

## 2024-11-07 RX ORDER — PREDNISONE 20 MG/1
20 TABLET ORAL DAILY
Qty: 5 TABLET | Refills: 0 | Status: SHIPPED | OUTPATIENT
Start: 2024-11-07 | End: 2024-11-12

## 2024-11-07 RX ORDER — FLUCONAZOLE 150 MG/1
150 TABLET ORAL DAILY
Qty: 1 TABLET | Refills: 1 | Status: SHIPPED | OUTPATIENT
Start: 2024-11-07 | End: 2024-11-09

## 2024-11-07 NOTE — PROGRESS NOTES
"Subjective:       Patient ID: Maricel Sepulveda is a 64 y.o. female.    Chief Complaint: Multiple medical conditions (Follow up for anxiety and URI)      HPI  65 yo female with sinus congestion, cough, red/itchy eyes, and post nasal drip for 1.5 weeks.  Seen in the [facility] twice in the last week and given a Z-Bennett and steroid shot.  Minimal improvement so far.  OTC medication has been minimally effective.   Review of Systems   Constitutional:  Negative for activity change.   HENT:  Negative for hearing loss and trouble swallowing.    Eyes:  Negative for discharge.   Respiratory:  Negative for chest tightness and wheezing.    Cardiovascular:  Negative for chest pain and palpitations.   Gastrointestinal:  Negative for constipation, diarrhea and vomiting.   Genitourinary:  Negative for difficulty urinating and hematuria.   Neurological:  Negative for headaches.   Psychiatric/Behavioral:  Negative for dysphoric mood.          Objective:       Vital Signs  Temp: 98.1 °F (36.7 °C)  Temp Source: Oral  Pulse: 69  Resp: (!) 22  SpO2: 99 %  BP: 125/75  BP Location: Left arm  Patient Position: Sitting  Height and Weight  Height: 5' 5" (165.1 cm)  Weight: 96.1 kg (211 lb 12.8 oz)  BSA (Calculated - sq m): 2.1 sq meters  BMI (Calculated): 35.2  Weight in (lb) to have BMI = 25: 149.9]  Physical Exam  Vitals reviewed.   Constitutional:       Appearance: Normal appearance.   HENT:      Head: Normocephalic and atraumatic.      Nose: Congestion present. No rhinorrhea.      Mouth/Throat:      Pharynx: Posterior oropharyngeal erythema present. No oropharyngeal exudate.   Eyes:      Extraocular Movements: Extraocular movements intact.      Conjunctiva/sclera: Conjunctivae normal.   Cardiovascular:      Rate and Rhythm: Normal rate and regular rhythm.      Heart sounds: Normal heart sounds.   Pulmonary:      Breath sounds: Normal breath sounds.   Skin:     General: Skin is warm and dry.   Neurological:      General: No focal deficit present.      " Mental Status: She is alert.   Psychiatric:         Mood and Affect: Mood and affect normal.         Speech: Speech normal.         Behavior: Behavior normal. Behavior is cooperative.         Thought Content: Thought content does not include homicidal or suicidal ideation.         Assessment:       Problem List Items Addressed This Visit    None  Visit Diagnoses       Bacterial sinusitis    -  Primary    Relevant Medications    predniSONE (DELTASONE) 20 MG tablet    amoxicillin-clavulanate 875-125mg (AUGMENTIN) 875-125 mg per tablet    fluconazole (DIFLUCAN) 150 MG Tab            Plan:   Take medication as directed  Encouraged antihistamines as needed  Encouraged ibuprofen/acetaminophen as needed  ER precautions  Follow-up in 3 months

## 2024-11-29 ENCOUNTER — OFFICE VISIT (OUTPATIENT)
Dept: URGENT CARE | Facility: CLINIC | Age: 64
End: 2024-11-29
Payer: COMMERCIAL

## 2024-11-29 VITALS
OXYGEN SATURATION: 99 % | HEART RATE: 64 BPM | WEIGHT: 212.38 LBS | DIASTOLIC BLOOD PRESSURE: 80 MMHG | SYSTOLIC BLOOD PRESSURE: 125 MMHG | BODY MASS INDEX: 35.38 KG/M2 | TEMPERATURE: 98 F | HEIGHT: 65 IN | RESPIRATION RATE: 18 BRPM

## 2024-11-29 DIAGNOSIS — R19.7 DIARRHEA, UNSPECIFIED TYPE: Primary | ICD-10-CM

## 2024-11-29 PROCEDURE — 99215 OFFICE O/P EST HI 40 MIN: CPT | Mod: PBBFAC | Performed by: FAMILY MEDICINE

## 2024-11-29 RX ORDER — ONDANSETRON 8 MG/1
8 TABLET, ORALLY DISINTEGRATING ORAL EVERY 8 HOURS PRN
Qty: 10 TABLET | Refills: 0 | Status: SHIPPED | OUTPATIENT
Start: 2024-11-29

## 2024-11-29 NOTE — LETTER
November 29, 2024      [facility]  [address] [address]  Phone: [phone]       Patient: Maricel Sepulveda   YOB: 1960  Date of Visit: 11/29/2024    To Whom It May Concern:    Tevin Sepulveda  was at [facility] on 11/29/2024. The patient may return to work/school on 12/2/2024 with no restrictions. If you have any questions or concerns, or if I can be of further assistance, please do not hesitate to contact me.    Sincerely,    BALDEV Lorenz MD

## 2024-11-29 NOTE — PROGRESS NOTES
"Subjective:       Patient ID: Maricel Sepulveda is a 64 y.o. female.    Vitals:  height is 5' 5" (1.651 m) and weight is 96.3 kg (212 lb 6.4 oz). Her temperature is 97.9 °F (36.6 °C). Her blood pressure is 125/80 and her pulse is 64. Her respiration is 18 and oxygen saturation is 99%.     Chief Complaint: Diarrhea (Diarrhea since Tuesday. States felt better, symptoms returned yesterday after eating. Abd cramps.)    Patient with diarrhea that began several days ago.  Improved, then she ate food for Thanksgiving and had vomiting and return of loose stool.  No blood.  Abdominal cramping relieved by bowel movement.  No fever.  No rash.  No sick contacts.    All other systems are negative    Chart reviewed    Objective:   Physical Exam   Constitutional:  Non-toxic appearance. She does not appear ill. No distress.   Neck: Neck supple.   Abdominal: Normal appearance. She exhibits no distension. flat abdomen There is no abdominal tenderness. There is no rebound, no guarding, no left CVA tenderness and no right CVA tenderness.   Neurological: no focal deficit. She is alert.   Skin: Skin is warm, dry and not diaphoretic.   Psychiatric: Her behavior is normal. Mood normal.   Nursing note and vitals reviewed.        Assessment:     1. Diarrhea, unspecified type            Plan:   Encouraged fluids.  Slowly advance diet as tolerated.  Prescription for Zofran as needed.  Monitor.  ER precautions.      Diarrhea, unspecified type  -     ondansetron (ZOFRAN-ODT) 8 MG TbDL; Take 1 tablet (8 mg total) by mouth every 8 (eight) hours as needed (nausea).  Dispense: 10 tablet; Refill: 0        Please note: This chart was completed via voice to text dictation. It may contain typographical/word recognition errors. If there are any questions, please contact the provider for final clarification.    "

## 2024-12-08 ENCOUNTER — OFFICE VISIT (OUTPATIENT)
Dept: URGENT CARE | Facility: CLINIC | Age: 64
End: 2024-12-08
Payer: COMMERCIAL

## 2024-12-08 VITALS
DIASTOLIC BLOOD PRESSURE: 84 MMHG | WEIGHT: 213 LBS | BODY MASS INDEX: 35.49 KG/M2 | OXYGEN SATURATION: 97 % | HEART RATE: 67 BPM | RESPIRATION RATE: 16 BRPM | TEMPERATURE: 98 F | HEIGHT: 65 IN | SYSTOLIC BLOOD PRESSURE: 133 MMHG

## 2024-12-08 DIAGNOSIS — L02.32 BOIL OF BUTTOCK: Primary | ICD-10-CM

## 2024-12-08 PROCEDURE — 99213 OFFICE O/P EST LOW 20 MIN: CPT | Mod: S$PBB,,,

## 2024-12-08 PROCEDURE — 99215 OFFICE O/P EST HI 40 MIN: CPT | Mod: PBBFAC

## 2024-12-09 NOTE — PROGRESS NOTES
"Subjective:       Patient ID: Maricel Sepulveda is a 64 y.o. female.    Vitals:  height is 5' 5" (1.651 m) and weight is 96.6 kg (213 lb). Her oral temperature is 98.4 °F (36.9 °C). Her blood pressure is 133/84 and her pulse is 67. Her respiration is 16 and oxygen saturation is 97%.     Chief Complaint: Recurrent Skin Infections (Boil to buttocks, patient reports it burst but painful.)    65 y/o white female presents to clinic with , she is c/o buttock bump which has spontaneously drained recently, denies pain, denies injury.         Constitution: Negative for chills and fever.   Skin:  Positive for abscess.       Objective:      Physical Exam   Constitutional: She is oriented to person, place, and time. She is cooperative. She is easily aroused.  Non-toxic appearance. She does not appear ill. obesityawake  HENT:   Head: Normocephalic and atraumatic.   Mouth/Throat: Mucous membranes are moist.   Pulmonary/Chest: Effort normal.   Neurological: She is alert, oriented to person, place, and time and easily aroused.   Skin: Skin is warm, dry and not diaphoretic. Capillary refill takes less than 2 seconds.              Comments: 1 cm abscess noted to intergluteal area, some induration, serosanguineous exudate (scant), no TTP, no odor.    Psychiatric: Mood normal.   Nursing note and vitals reviewed.        Assessment:       1. Boil of buttock          Plan:     Encouraged to continue with conservative treatment. Boil is self-draining, no I&D warranted. Encouraged to continue with warm compress, monitor for worsening symptoms. Keep your scheduled appt with Dr. Valles.     Boil of buttock                        "

## 2024-12-10 ENCOUNTER — OFFICE VISIT (OUTPATIENT)
Dept: URGENT CARE | Facility: CLINIC | Age: 64
End: 2024-12-10
Payer: COMMERCIAL

## 2024-12-10 VITALS
OXYGEN SATURATION: 98 % | BODY MASS INDEX: 35.95 KG/M2 | DIASTOLIC BLOOD PRESSURE: 73 MMHG | SYSTOLIC BLOOD PRESSURE: 122 MMHG | RESPIRATION RATE: 20 BRPM | WEIGHT: 210.56 LBS | HEIGHT: 64 IN | HEART RATE: 79 BPM | TEMPERATURE: 99 F

## 2024-12-10 DIAGNOSIS — R05.9 COUGH, UNSPECIFIED TYPE: ICD-10-CM

## 2024-12-10 DIAGNOSIS — J06.9 VIRAL URI: Primary | ICD-10-CM

## 2024-12-10 DIAGNOSIS — Z20.828 EXPOSURE TO INFLUENZA: ICD-10-CM

## 2024-12-10 LAB
CTP QC/QA: YES
CTP QC/QA: YES
POC MOLECULAR INFLUENZA A AGN: NEGATIVE
POC MOLECULAR INFLUENZA B AGN: NEGATIVE
SARS-COV-2 AG RESP QL IA.RAPID: NEGATIVE

## 2024-12-10 PROCEDURE — 99213 OFFICE O/P EST LOW 20 MIN: CPT | Mod: S$PBB,,, | Performed by: FAMILY MEDICINE

## 2024-12-10 PROCEDURE — 99215 OFFICE O/P EST HI 40 MIN: CPT | Mod: PBBFAC | Performed by: FAMILY MEDICINE

## 2024-12-10 PROCEDURE — 87502 INFLUENZA DNA AMP PROBE: CPT | Mod: PBBFAC | Performed by: FAMILY MEDICINE

## 2024-12-10 PROCEDURE — 87811 SARS-COV-2 COVID19 W/OPTIC: CPT | Mod: PBBFAC | Performed by: FAMILY MEDICINE

## 2024-12-10 RX ORDER — PROMETHAZINE HYDROCHLORIDE AND DEXTROMETHORPHAN HYDROBROMIDE 6.25; 15 MG/5ML; MG/5ML
5 SYRUP ORAL
Qty: 120 ML | Refills: 0 | Status: SHIPPED | OUTPATIENT
Start: 2024-12-10

## 2024-12-10 RX ORDER — OSELTAMIVIR PHOSPHATE 75 MG/1
75 CAPSULE ORAL 2 TIMES DAILY
Qty: 10 CAPSULE | Refills: 0 | Status: SHIPPED | OUTPATIENT
Start: 2024-12-10 | End: 2024-12-15

## 2024-12-10 NOTE — PROGRESS NOTES
"Subjective:       Patient ID: Maricel Sepulveda is a 64 y.o. female.    Vitals:  height is 5' 4" (1.626 m) and weight is 95.5 kg (210 lb 8.6 oz). Her temperature is 98.7 °F (37.1 °C). Her blood pressure is 122/73 and her pulse is 79. Her respiration is 20 and oxygen saturation is 98%.     Chief Complaint: URI (Cough, fatigue x 1day)    Patient with 1 day of cough, myalgias, malaise.  Possibly subjective fever.   in diagnosed with influenza, grandchild likely has it also.    All other systems are negative    Chart reviewed    Objective:   Physical Exam   Constitutional: She appears well-developed.  Non-toxic appearance. She does not appear ill. No distress.   HENT:   Head: Atraumatic.   Nose: No purulent discharge. Right sinus exhibits no maxillary sinus tenderness and no frontal sinus tenderness. Left sinus exhibits no maxillary sinus tenderness and no frontal sinus tenderness.   Mouth/Throat: Uvula is midline.   Eyes: Right eye exhibits no discharge. Left eye exhibits no discharge. Extraocular movement intact   Neck: Neck supple. No neck rigidity present.   Cardiovascular: Regular rhythm.   Pulmonary/Chest: Effort normal and breath sounds normal. No respiratory distress. She has no wheezes. She has no rhonchi. She has no rales.   Lymphadenopathy:     She has no cervical adenopathy.   Neurological: She is alert.   Skin: Skin is warm, dry and not diaphoretic.   Psychiatric: Her behavior is normal.   Nursing note and vitals reviewed.    Results for orders placed or performed in visit on 12/10/24   POCT Influenza A/B Molecular    Collection Time: 12/10/24 11:56 AM   Result Value Ref Range    POC Molecular Influenza A Ag Negative Negative    POC Molecular Influenza B Ag Negative Negative     Acceptable Yes    SARS Coronavirus 2 Antigen, POCT Manual Read    Collection Time: 12/10/24 11:56 AM   Result Value Ref Range    SARS Coronavirus 2 Antigen Negative Negative     Acceptable Yes  "         Assessment:     1. Viral URI    2. Exposure to influenza    3. Cough, unspecified type            Plan:   Secondary to exposure and current illness, will presume influenza and give a course of Tamiflu.  Phenergan DM with side effect precautions.  Encouraged fluids and discussed potential contagious precautions.      Viral URI  -     oseltamivir (TAMIFLU) 75 MG capsule; Take 1 capsule (75 mg total) by mouth 2 (two) times daily. for 5 days  Dispense: 10 capsule; Refill: 0    Exposure to influenza  -     oseltamivir (TAMIFLU) 75 MG capsule; Take 1 capsule (75 mg total) by mouth 2 (two) times daily. for 5 days  Dispense: 10 capsule; Refill: 0    Cough, unspecified type  -     POCT Influenza A/B Molecular  -     SARS Coronavirus 2 Antigen, POCT Manual Read  -     promethazine-dextromethorphan (PROMETHAZINE-DM) 6.25-15 mg/5 mL Syrp; Take 5 mLs by mouth every 6 to 8 hours as needed (cough). May cause sedation.  Do not drive or operate heavy machinery.  Dispense: 120 mL; Refill: 0        Please note: This chart was completed via voice to text dictation. It may contain typographical/word recognition errors. If there are any questions, please contact the provider for final clarification.

## 2024-12-10 NOTE — LETTER
December 10, 2024      [facility]  [address] [address]  Phone: [phone]       Patient: Maricel Sepulveda   YOB: 1960  Date of Visit: 12/10/2024    To Whom It May Concern:    Tevin Sepulveda  was at [facility] on 12/10/2024. The patient may return to work/school on DEC 14 2024 with no restrictions. If you have any questions or concerns, or if I can be of further assistance, please do not hesitate to contact me.    Sincerely,    REAGAN JOSHUA MD

## 2025-03-06 ENCOUNTER — HOSPITAL ENCOUNTER (OUTPATIENT)
Dept: RADIOLOGY | Facility: HOSPITAL | Age: 65
Discharge: HOME | End: 2025-03-06
Payer: COMMERCIAL

## 2025-03-06 ENCOUNTER — OFFICE VISIT (OUTPATIENT)
Dept: FAMILY MEDICINE | Facility: CLINIC | Age: 65
End: 2025-03-06
Payer: COMMERCIAL

## 2025-03-06 VITALS
TEMPERATURE: 98 F | DIASTOLIC BLOOD PRESSURE: 81 MMHG | SYSTOLIC BLOOD PRESSURE: 137 MMHG | HEIGHT: 64 IN | BODY MASS INDEX: 34.52 KG/M2 | HEART RATE: 66 BPM | RESPIRATION RATE: 20 BRPM | WEIGHT: 202.19 LBS | OXYGEN SATURATION: 97 %

## 2025-03-06 DIAGNOSIS — M25.569 KNEE PAIN, UNSPECIFIED CHRONICITY, UNSPECIFIED LATERALITY: ICD-10-CM

## 2025-03-06 DIAGNOSIS — M25.569 KNEE PAIN, UNSPECIFIED CHRONICITY, UNSPECIFIED LATERALITY: Primary | ICD-10-CM

## 2025-03-06 DIAGNOSIS — F41.9 ANXIETY: ICD-10-CM

## 2025-03-06 DIAGNOSIS — R10.10 PAIN OF UPPER ABDOMEN: ICD-10-CM

## 2025-03-06 PROCEDURE — 99215 OFFICE O/P EST HI 40 MIN: CPT | Mod: PBBFAC,25

## 2025-03-06 PROCEDURE — 73564 X-RAY EXAM KNEE 4 OR MORE: CPT | Mod: TC,RT

## 2025-03-06 RX ORDER — DICYCLOMINE HYDROCHLORIDE 10 MG/1
10 CAPSULE ORAL 3 TIMES DAILY PRN
Qty: 90 CAPSULE | Refills: 0 | Status: SHIPPED | OUTPATIENT
Start: 2025-03-06 | End: 2025-04-05

## 2025-03-06 NOTE — PROGRESS NOTES
Family Medicine Clinic Note     Subjective     Patient ID: Maricel Sepulveda is a 64 y.o. female.    Chief Complaint: Abdominal Cramping (After eating for 2-3 months)    Pt is a 65 yo F here for follow up.     Abdominal Pain:  -Has been present for several months; getting worse since Jan 2025  -Pain occurs after every meal, improves when she does not eat  -Pain is present in middle and right upper quadrants  -Admits to associated loose BM  -Due for colonoscopy in 6/2025    R knee pain:  -Has been getting worse over the past few months  -Denied any known trauma or injury to knee  -Feels like knee locks on her and crunches when she moves it   -Has not been taking anything for the pain      Current Outpatient Medications   Medication Instructions    albuterol (PROVENTIL) 2.5 mg, Nebulization, Every 6 hours PRN, Rescue    azelastine (ASTELIN) 137 mcg, Nasal, 2 times daily    busPIRone (BUSPAR) 5 mg, Oral, 2 times daily    diclofenac (VOLTAREN) 50 mg, Oral, 3 times daily    diclofenac sodium (VOLTAREN) 2 g, 4 times daily    estradiol 0.025 mg/24 hr 0.025 mg/24 hr 1 patch, Every 7 days    fluticasone propionate (FLONASE) 100 mcg, Each Nostril, 2 times daily    levothyroxine (SYNTHROID) 88 MCG tablet 1 tablet, Every morning    loratadine (CLARITIN) 10 mg, Oral, Daily    meclizine (ANTIVERT) 12.5 mg, Oral, 3 times daily PRN    olopatadine (PATANOL) 0.1 % ophthalmic solution 1 drop, Left Eye, 2 times daily, Use in affected eye    ondansetron (ZOFRAN-ODT) 8 mg, Oral, Every 8 hours PRN    progesterone (PROMETRIUM) 100 MG capsule 1 capsule, Daily    promethazine-dextromethorphan (PROMETHAZINE-DM) 6.25-15 mg/5 mL Syrp 5 mLs, Oral, Every 6-8 hours PRN, May cause sedation.  Do not drive or operate heavy machinery.    tacrolimus (PROTOPIC) 0.1 % ointment Topical (Top), 2 times daily, Apply to hands and feet 2x daily prn for eczema flares up    traZODone (DESYREL) 50 mg, Oral, Nightly       Review of Systems   Constitutional:  Negative  "for chills, fever and malaise/fatigue.   Respiratory:  Negative for shortness of breath.    Cardiovascular:  Negative for chest pain and palpitations.   Gastrointestinal:  Positive for abdominal pain and diarrhea. Negative for blood in stool, constipation, nausea and vomiting.   Musculoskeletal:  Positive for joint pain.      Objective     Vitals:    03/06/25 1505   BP: 137/81   BP Location: Left arm   Patient Position: Sitting   Pulse: 66   Resp: 20   Temp: 97.7 °F (36.5 °C)   TempSrc: Oral   SpO2: 97%   Weight: 91.7 kg (202 lb 3.2 oz)   Height: 5' 4" (1.626 m)     Physical Exam  Vitals and nursing note reviewed.   Constitutional:       General: She is not in acute distress.  Cardiovascular:      Rate and Rhythm: Normal rate and regular rhythm.   Pulmonary:      Effort: Pulmonary effort is normal.      Breath sounds: No wheezing or rhonchi.   Abdominal:      General: Bowel sounds are normal. There is no distension.      Palpations: Abdomen is soft. There is no hepatomegaly or mass.      Tenderness: There is no abdominal tenderness. There is no guarding. Negative signs include Jalloh's sign.   Musculoskeletal:      Right knee: Crepitus present. No swelling, effusion, erythema or bony tenderness. No LCL laxity or MCL laxity.      Instability Tests: Anterior drawer test negative. Posterior drawer test negative.      Comments: R knee not tender to palpation today   Neurological:      Mental Status: She is alert.       Assessment and Plan      1. Knee pain, unspecified chronicity, unspecified laterality    2. Pain of upper abdomen      -Concern for OA vs patellofemoral pain syndrome. Xray ordered. Continue with Tylenol and Voltaren gel as needed for pain control. If OA seen on xray can bring back for possible CSI.    Xray with medial joint space narrowing. Subchondral sclerosis present. Fabella. Minimal osteophytes present.  -Abdomen with minimal tenderness today. Will order U/S to r/o possible gallbladder pathologies. " Will start on Bentyl to help with acute pain.     Orders:  -     X-Ray Knee Complete 4 or More Views Right; Future; Expected date: 03/06/2025  -     US Abdomen Limited; Future; Expected date: 03/06/2025       Follow up in about 1 week (around 3/13/2025) for steroid injection.      Anand Nash MD  [facility]

## 2025-03-09 RX ORDER — BUSPIRONE HYDROCHLORIDE 5 MG/1
5 TABLET ORAL 2 TIMES DAILY
Qty: 60 TABLET | Refills: 2 | Status: SHIPPED | OUTPATIENT
Start: 2025-03-09 | End: 2026-03-09

## 2025-03-13 ENCOUNTER — OFFICE VISIT (OUTPATIENT)
Dept: FAMILY MEDICINE | Facility: CLINIC | Age: 65
End: 2025-03-13
Payer: COMMERCIAL

## 2025-03-13 VITALS
DIASTOLIC BLOOD PRESSURE: 72 MMHG | RESPIRATION RATE: 18 BRPM | HEART RATE: 70 BPM | BODY MASS INDEX: 34.72 KG/M2 | WEIGHT: 203.38 LBS | SYSTOLIC BLOOD PRESSURE: 109 MMHG | HEIGHT: 64 IN | OXYGEN SATURATION: 97 % | TEMPERATURE: 98 F

## 2025-03-13 DIAGNOSIS — K21.9 GASTROESOPHAGEAL REFLUX DISEASE, UNSPECIFIED WHETHER ESOPHAGITIS PRESENT: ICD-10-CM

## 2025-03-13 DIAGNOSIS — M17.11 PRIMARY OSTEOARTHRITIS OF RIGHT KNEE: Primary | ICD-10-CM

## 2025-03-13 PROCEDURE — 99215 OFFICE O/P EST HI 40 MIN: CPT | Mod: PBBFAC

## 2025-03-13 RX ORDER — LORATADINE 10 MG/1
10 TABLET ORAL DAILY
Qty: 90 TABLET | Refills: 3 | Status: SHIPPED | OUTPATIENT
Start: 2025-03-13

## 2025-03-13 RX ORDER — ESTRADIOL 0.03 MG/D
1 PATCH TRANSDERMAL
Qty: 4 PATCH | Refills: 10 | Status: SHIPPED | OUTPATIENT
Start: 2025-03-13

## 2025-03-13 RX ORDER — PANTOPRAZOLE SODIUM 40 MG/1
40 TABLET, DELAYED RELEASE ORAL DAILY
Qty: 30 TABLET | Refills: 1 | Status: SHIPPED | OUTPATIENT
Start: 2025-03-13 | End: 2025-05-12

## 2025-03-13 RX ORDER — TRAZODONE HYDROCHLORIDE 50 MG/1
50 TABLET ORAL NIGHTLY
Qty: 30 TABLET | Refills: 5 | Status: SHIPPED | OUTPATIENT
Start: 2025-03-13 | End: 2026-03-13

## 2025-03-13 NOTE — PROGRESS NOTES
[facility] Note  03/13/2025   CHIEF COMPLAINT:  Chief Complaint   Patient presents with    Follow-up     1 week follow-up, right knee pain       HISTORY OF  PRESENT ILLNESS:  Maricel Sepulveda is a 64 y.o. female who presents to clinic today for Right knee pain.     Complaints:  none    Interval History:  1) Right knee pain  Onset 4 months ago  She states that her knee pain is worse at night   She currently works at [employer] and is standing all day which she feels like his only exacerbating her knee pain   The knee pain is mainly in the located in the inside of her right knee  Patient received a right knee x-ray on 03/06/25 that showed no acute abnormalities  Has been taking ibuprofen/Tylenol which has been providing mild to moderate pain relief   Patient was told that she would have to come back for a joint injection of her knee which she does not want to pursue at this time; she prefers more conservative treatment    2) abdominal pain  Patient states that she has been having kind generalized abdominal pain/epigastric pain that is kind of like a burning type sensation that is worse with meals along with some loose bowel movements that is not diarrhea but soft   She was previously seen in clinic on 06/06/2025 was prescribed Bentyl 10 mg t.i.d. p.r.n.   Patient states that the Bentyl did not provide any improvement of her abdominal pain general   She does state that in the past she had something similar happened to her where she took prescription acid reflux medicine which did seem to help  She does have a scheduled ultrasound of her gallbladder on 03/17/25, she plans on completing further evaluation   She denies appetite changes, nausea, vomiting but admits to bloating, belching, and reflux      REVIEW OF SYSTEMS:  See HPI      Past Medical History:   Diagnosis Date    Hyperparathyroidism 2001    Hypertension     Hypothyroidism     Radiation 2001    Sleep apnea, unspecified       Past Surgical History:   Procedure  Laterality Date     SECTION      TONSILLECTOMY  1968    TUBAL LIGATION      WRIST SURGERY Left       Social History     Socioeconomic History    Marital status:    Tobacco Use    Smoking status: Former     Current packs/day: 0.00     Average packs/day: 0.3 packs/day for 6.1 years (1.5 ttl pk-yrs)     Types: Cigarettes     Start date: 1980     Quit date: 1986     Years since quittin.5     Passive exposure: Yes    Smokeless tobacco: Never    Tobacco comments:     Quit 30 years+   Substance and Sexual Activity    Alcohol use: Not Currently    Drug use: Never    Sexual activity: Not Currently     Partners: Male     Birth control/protection: None     Social Drivers of Health     Financial Resource Strain: Medium Risk (3/6/2025)    Overall Financial Resource Strain (CARDIA)     Difficulty of Paying Living Expenses: Somewhat hard   Food Insecurity: No Food Insecurity (3/6/2025)    Hunger Vital Sign     Worried About Running Out of Food in the Last Year: Never true     Ran Out of Food in the Last Year: Never true   Transportation Needs: No Transportation Needs (3/6/2025)    PRAPARE - Transportation     Lack of Transportation (Medical): No     Lack of Transportation (Non-Medical): No   Physical Activity: Insufficiently Active (3/6/2025)    Exercise Vital Sign     Days of Exercise per Week: 7 days     Minutes of Exercise per Session: 10 min   Stress: Stress Concern Present (3/6/2025)    [employer] [place] - Occupational Stress Questionnaire     Feeling of Stress : To some extent   Housing Stability: Low Risk  (3/6/2025)    Housing Stability Vital Sign     Unable to Pay for Housing in the Last Year: No     Number of Times Moved in the Last Year: 0     Homeless in the Last Year: No       Current Outpatient Medications   Medication Instructions    albuterol (PROVENTIL) 2.5 mg, Nebulization, Every 6 hours PRN, Rescue    azelastine (ASTELIN) 137 mcg, Nasal, 2 times daily     "busPIRone (BUSPAR) 5 mg, Oral, 2 times daily    diclofenac (VOLTAREN) 50 mg, Oral, 3 times daily    diclofenac sodium (VOLTAREN) 2 g, 4 times daily    dicyclomine (BENTYL) 10 mg, Oral, 3 times daily PRN    estradiol 0.025 mg/24 hr 0.025 mg/24 hr 1 patch, Every 7 days    fluticasone propionate (FLONASE) 100 mcg, Each Nostril, 2 times daily    levothyroxine (SYNTHROID) 88 MCG tablet 1 tablet, Every morning    loratadine (CLARITIN) 10 mg, Oral, Daily    meclizine (ANTIVERT) 12.5 mg, Oral, 3 times daily PRN    olopatadine (PATANOL) 0.1 % ophthalmic solution 1 drop, Left Eye, 2 times daily, Use in affected eye    ondansetron (ZOFRAN-ODT) 8 mg, Oral, Every 8 hours PRN    progesterone (PROMETRIUM) 100 MG capsule 1 capsule, Daily    promethazine-dextromethorphan (PROMETHAZINE-DM) 6.25-15 mg/5 mL Syrp 5 mLs, Oral, Every 6-8 hours PRN, May cause sedation.  Do not drive or operate heavy machinery.    tacrolimus (PROTOPIC) 0.1 % ointment Topical (Top), 2 times daily, Apply to hands and feet 2x daily prn for eczema flares up    traZODone (DESYREL) 50 mg, Oral, Nightly          PHYSICAL EXAMINATION:  Blood pressure 109/72, pulse 70, temperature 97.8 °F (36.6 °C), temperature source Oral, resp. rate 18, height 5' 4" (1.626 m), weight 92.3 kg (203 lb 6.4 oz), SpO2 97%.    General:  VSS. No acute distress.   Respiratory: clear to ascultation in all lung fields  Cardiovascular:  RRR, no additional heart sounds heard.  ABD: BS +, soft, nontender, negative Jalloh's sign, negative Rovsing sign, negative Mcburney's point  Musculoskeletal:  Full range of motion of all extremities; no joint deformities or peripheral edema. DP 2+ pulses palpable bilaterally.  Tenderness present along the right medial joint line of right knee.  Negative posterior drawer, anterior Lachman's test.  Strength 5/5 in lower extremity extension/flexion.  Strength 5/5 in bilateral dorsiflexion/plantar flexion      ASSESSMENT/PLAN:    1) Right OA of knee  Provided " patient with knee exercise handouts   Encouraged patient to take Tylenol/Motrin every 4-6 hours as needed for pain relief   inform patient to apply Voltaren gel as needed for pain control of right knee    2) GERD  Prescribe patient 30 tablets of pantoprazole 40 mg daily for her to take  Inform patient to follow up with PCP to see if there is any resolution in patient's reflux      - return to clinic in 1 month and follow up with PCP; refilled patient's home meds       Mino Alvarado MD   [facility] Resident  03/13/2025

## 2025-03-14 NOTE — PROGRESS NOTES
I have reveiwed and agree with the resident's findings, including all diagnostic interpretations and plans as written.

## 2025-03-17 ENCOUNTER — HOSPITAL ENCOUNTER (OUTPATIENT)
Dept: RADIOLOGY | Facility: HOSPITAL | Age: 65
Discharge: HOME | End: 2025-03-17
Payer: COMMERCIAL

## 2025-03-17 DIAGNOSIS — R10.10 PAIN OF UPPER ABDOMEN: ICD-10-CM

## 2025-03-17 PROCEDURE — 76705 ECHO EXAM OF ABDOMEN: CPT | Mod: TC

## 2025-03-20 ENCOUNTER — HOSPITAL ENCOUNTER (OUTPATIENT)
Dept: RADIOLOGY | Facility: HOSPITAL | Age: 65
Discharge: HOME | End: 2025-03-20
Attending: FAMILY MEDICINE
Payer: COMMERCIAL

## 2025-03-20 DIAGNOSIS — Z12.31 ENCOUNTER FOR SCREENING MAMMOGRAM FOR BREAST CANCER: ICD-10-CM

## 2025-03-20 PROCEDURE — 77063 BREAST TOMOSYNTHESIS BI: CPT | Mod: 26,,, | Performed by: RADIOLOGY

## 2025-03-20 PROCEDURE — 77063 BREAST TOMOSYNTHESIS BI: CPT | Mod: TC

## 2025-03-20 PROCEDURE — 77067 SCR MAMMO BI INCL CAD: CPT | Mod: 26,,, | Performed by: RADIOLOGY

## 2025-03-24 ENCOUNTER — OFFICE VISIT (OUTPATIENT)
Dept: FAMILY MEDICINE | Facility: CLINIC | Age: 65
End: 2025-03-24
Payer: COMMERCIAL

## 2025-03-24 VITALS
OXYGEN SATURATION: 98 % | DIASTOLIC BLOOD PRESSURE: 68 MMHG | TEMPERATURE: 98 F | WEIGHT: 201.81 LBS | BODY MASS INDEX: 34.45 KG/M2 | HEIGHT: 64 IN | SYSTOLIC BLOOD PRESSURE: 112 MMHG | HEART RATE: 62 BPM

## 2025-03-24 DIAGNOSIS — G47.00 INSOMNIA, UNSPECIFIED TYPE: ICD-10-CM

## 2025-03-24 DIAGNOSIS — F41.9 ANXIETY: ICD-10-CM

## 2025-03-24 DIAGNOSIS — L30.1 DYSHIDROTIC ECZEMA: ICD-10-CM

## 2025-03-24 DIAGNOSIS — K58.2 IRRITABLE BOWEL SYNDROME WITH BOTH CONSTIPATION AND DIARRHEA: Primary | ICD-10-CM

## 2025-03-24 PROCEDURE — 99213 OFFICE O/P EST LOW 20 MIN: CPT | Mod: PBBFAC | Performed by: FAMILY MEDICINE

## 2025-03-24 RX ORDER — TRIAMCINOLONE ACETONIDE 1 MG/G
OINTMENT TOPICAL 2 TIMES DAILY
Qty: 80 G | Refills: 2 | Status: SHIPPED | OUTPATIENT
Start: 2025-03-24

## 2025-03-24 RX ORDER — BUSPIRONE HYDROCHLORIDE 5 MG/1
5 TABLET ORAL 3 TIMES DAILY
Qty: 90 TABLET | Refills: 2 | Status: SHIPPED | OUTPATIENT
Start: 2025-03-24 | End: 2026-03-24

## 2025-03-24 RX ORDER — AMITRIPTYLINE HYDROCHLORIDE 25 MG/1
25 TABLET, FILM COATED ORAL NIGHTLY PRN
Qty: 30 TABLET | Refills: 0 | Status: SHIPPED | OUTPATIENT
Start: 2025-03-24 | End: 2025-03-24 | Stop reason: SDUPTHER

## 2025-03-24 RX ORDER — AMITRIPTYLINE HYDROCHLORIDE 25 MG/1
25 TABLET, FILM COATED ORAL NIGHTLY
Start: 2025-03-24 | End: 2026-03-24

## 2025-03-24 NOTE — LETTER
March 24, 2025      [facility]  [facility] access hospital[address] [facility] [address]  Phone: [phone]       Patient: Maricel Sepulveda   YOB: 1960  Date of Visit: 03/24/2025    To Whom It May Concern:    Tevin Sepulveda  was at [facility] on 03/24/2025. The patient may return to work/school on 03/24/2025 with no restrictions. If you have any questions or concerns, or if I can be of further assistance, please do not hesitate to contact me.    Sincerely,    Jojo Ambrose LPN

## 2025-03-24 NOTE — PROGRESS NOTES
"Subjective:       Patient ID: Maricel Sepulveda is a 64 y.o. female.    Chief Complaint: Follow-up (Primary arthritis of R knee)      Follow-up  Pertinent negatives include no chest pain, headaches or vomiting.     63 yo female here for FU to right knee arthritis pain. Still hurting with ambulation and routine activities.  Having > 1 month of loose stool and abdominal cramping.  Abdominal US was WNL.  Admits to being stressed out.  She has also had insomnia that has not responded to trazodone.  Willing to try amitriptyline.  We will also increase buspirone to TID.   Review of Systems   Constitutional:  Negative for activity change.   HENT:  Negative for hearing loss and trouble swallowing.    Eyes:  Negative for discharge.   Respiratory:  Negative for chest tightness and wheezing.    Cardiovascular:  Negative for chest pain and palpitations.   Gastrointestinal:  Positive for diarrhea. Negative for constipation and vomiting.   Genitourinary:  Negative for difficulty urinating and hematuria.   Neurological:  Negative for headaches.   Psychiatric/Behavioral:  Negative for dysphoric mood.          Objective:       Vital Signs  Temp: 98 °F (36.7 °C)  Temp Source: Oral  Pulse: 62  SpO2: 98 %  BP: 112/68  BP Location: Right arm  Patient Position: Sitting  Pain Score: 0-No pain  Height and Weight  Height: 5' 4" (162.6 cm)  Weight: 91.5 kg (201 lb 12.8 oz)  BSA (Calculated - sq m): 2.03 sq meters  BMI (Calculated): 34.6  Weight in (lb) to have BMI = 25: 145.3]  Physical Exam  Vitals reviewed.   Constitutional:       Appearance: Normal appearance.   HENT:      Head: Normocephalic and atraumatic.   Eyes:      Extraocular Movements: Extraocular movements intact.      Conjunctiva/sclera: Conjunctivae normal.   Cardiovascular:      Rate and Rhythm: Normal rate and regular rhythm.      Heart sounds: Normal heart sounds.   Pulmonary:      Breath sounds: Normal breath sounds.   Skin:     General: Skin is warm and dry.   Neurological:    "   General: No focal deficit present.      Mental Status: She is alert.   Psychiatric:         Mood and Affect: Mood and affect normal.         Speech: Speech normal.         Behavior: Behavior normal. Behavior is cooperative.         Thought Content: Thought content does not include homicidal or suicidal ideation.         Assessment:       Problem List Items Addressed This Visit          Psychiatric    Anxiety    Relevant Medications    busPIRone (BUSPAR) 5 MG Tab     Other Visit Diagnoses         Irritable bowel syndrome with both constipation and diarrhea    -  Primary      Insomnia, unspecified type        Relevant Medications    amitriptyline (ELAVIL) 25 MG tablet      Dyshidrotic eczema        Relevant Medications    triamcinolone acetonide 0.1% (KENALOG) 0.1 % ointment            Plan:   Take medication as prescribed  ER precautions  FU in 1 month

## 2025-04-21 ENCOUNTER — CLINICAL SUPPORT (OUTPATIENT)
Dept: FAMILY MEDICINE | Facility: CLINIC | Age: 65
End: 2025-04-21
Payer: COMMERCIAL

## 2025-04-21 DIAGNOSIS — F41.9 ANXIETY: Primary | ICD-10-CM

## 2025-04-21 DIAGNOSIS — E03.9 HYPOTHYROIDISM, UNSPECIFIED TYPE: ICD-10-CM

## 2025-04-21 RX ORDER — BUSPIRONE HYDROCHLORIDE 10 MG/1
10 TABLET ORAL 3 TIMES DAILY
Qty: 90 TABLET | Refills: 5 | Status: SHIPPED | OUTPATIENT
Start: 2025-04-21 | End: 2026-04-21

## 2025-04-21 RX ORDER — FLUTICASONE PROPIONATE 50 MCG
2 SPRAY, SUSPENSION (ML) NASAL 2 TIMES DAILY
Qty: 48 G | Refills: 3 | Status: SHIPPED | OUTPATIENT
Start: 2025-04-21

## 2025-04-21 RX ORDER — LEVOTHYROXINE SODIUM 88 UG/1
88 TABLET ORAL EVERY MORNING
Qty: 30 TABLET | Refills: 11 | Status: SHIPPED | OUTPATIENT
Start: 2025-04-21 | End: 2026-04-21

## 2025-04-21 NOTE — PROGRESS NOTES
Subjective:       Patient ID: Maricel Sepulveda is a 64 y.o. female.    Chief Complaint:  Anxiety    Pt is in the state of [place]  ID through     HPI  63 yo female started on Buspirone for anxiety at last visit. Working, but she thinks she could increase it.  Had a sinus infection. Treated with Augmentin, resolved.  Needs refills on Flonase and Synthroid.  No other concerns.   Review of Systems   Constitutional:  Negative for activity change.   HENT:  Negative for hearing loss and trouble swallowing.    Eyes:  Negative for discharge.   Respiratory:  Negative for chest tightness and wheezing.    Cardiovascular:  Negative for chest pain and palpitations.   Gastrointestinal:  Negative for constipation, diarrhea and vomiting.   Genitourinary:  Negative for difficulty urinating and hematuria.   Neurological:  Negative for headaches.   Psychiatric/Behavioral:  Negative for dysphoric mood.          Objective:        ]  Physical Exam  Vitals reviewed.   Constitutional:       Appearance: Normal appearance.   Neurological:      Mental Status: She is alert.   Psychiatric:         Mood and Affect: Mood normal.         Assessment:       Problem List Items Addressed This Visit          Psychiatric    Anxiety - Primary    Relevant Medications    busPIRone (BUSPAR) 10 MG tablet    fluticasone propionate (FLONASE) 50 mcg/actuation nasal spray       Endocrine    Hypothyroidism    Relevant Medications    levothyroxine (SYNTHROID) 88 MCG tablet       Plan:   Take medication as prescribed  ER precautions  RTC in 3 months

## 2025-07-10 ENCOUNTER — OFFICE VISIT (OUTPATIENT)
Dept: FAMILY MEDICINE | Facility: CLINIC | Age: 65
End: 2025-07-10
Payer: COMMERCIAL

## 2025-07-10 VITALS
HEIGHT: 64 IN | SYSTOLIC BLOOD PRESSURE: 123 MMHG | DIASTOLIC BLOOD PRESSURE: 73 MMHG | BODY MASS INDEX: 34.28 KG/M2 | WEIGHT: 200.81 LBS | HEART RATE: 64 BPM | TEMPERATURE: 98 F | OXYGEN SATURATION: 98 %

## 2025-07-10 DIAGNOSIS — F41.9 ANXIETY: Primary | ICD-10-CM

## 2025-07-10 DIAGNOSIS — E03.9 HYPOTHYROIDISM, UNSPECIFIED TYPE: ICD-10-CM

## 2025-07-10 LAB
ALBUMIN SERPL-MCNC: 3.6 G/DL (ref 3.4–4.8)
ALBUMIN/GLOB SERPL: 0.8 RATIO (ref 1.1–2)
ALP SERPL-CCNC: 93 UNIT/L (ref 40–150)
ALT SERPL-CCNC: 9 UNIT/L (ref 0–55)
ANION GAP SERPL CALC-SCNC: 7 MEQ/L
AST SERPL-CCNC: 16 UNIT/L (ref 11–45)
BASOPHILS # BLD AUTO: 0.04 X10(3)/MCL
BASOPHILS NFR BLD AUTO: 0.8 %
BILIRUB SERPL-MCNC: 0.4 MG/DL
BUN SERPL-MCNC: 13.2 MG/DL (ref 9.8–20.1)
CALCIUM SERPL-MCNC: 9 MG/DL (ref 8.4–10.2)
CHLORIDE SERPL-SCNC: 108 MMOL/L (ref 98–107)
CHOLEST SERPL-MCNC: 215 MG/DL
CHOLEST/HDLC SERPL: 4 {RATIO} (ref 0–5)
CO2 SERPL-SCNC: 26 MMOL/L (ref 23–31)
CREAT SERPL-MCNC: 0.68 MG/DL (ref 0.55–1.02)
CREAT/UREA NIT SERPL: 19
EOSINOPHIL # BLD AUTO: 0.11 X10(3)/MCL (ref 0–0.9)
EOSINOPHIL NFR BLD AUTO: 2.1 %
ERYTHROCYTE [DISTWIDTH] IN BLOOD BY AUTOMATED COUNT: 13.9 % (ref 11.5–17)
GFR SERPLBLD CREATININE-BSD FMLA CKD-EPI: >60 ML/MIN/1.73/M2
GLOBULIN SER-MCNC: 4.3 GM/DL (ref 2.4–3.5)
GLUCOSE SERPL-MCNC: 87 MG/DL (ref 82–115)
HCT VFR BLD AUTO: 43.2 % (ref 37–47)
HDLC SERPL-MCNC: 59 MG/DL (ref 35–60)
HGB BLD-MCNC: 14.2 G/DL (ref 12–16)
IMM GRANULOCYTES # BLD AUTO: 0.02 X10(3)/MCL (ref 0–0.04)
IMM GRANULOCYTES NFR BLD AUTO: 0.4 %
LDLC SERPL CALC-MCNC: 112 MG/DL (ref 50–140)
LYMPHOCYTES # BLD AUTO: 2.2 X10(3)/MCL (ref 0.6–4.6)
LYMPHOCYTES NFR BLD AUTO: 42.1 %
MCH RBC QN AUTO: 28.7 PG (ref 27–31)
MCHC RBC AUTO-ENTMCNC: 32.9 G/DL (ref 33–36)
MCV RBC AUTO: 87.4 FL (ref 80–94)
MONOCYTES # BLD AUTO: 0.38 X10(3)/MCL (ref 0.1–1.3)
MONOCYTES NFR BLD AUTO: 7.3 %
NEUTROPHILS # BLD AUTO: 2.48 X10(3)/MCL (ref 2.1–9.2)
NEUTROPHILS NFR BLD AUTO: 47.3 %
NRBC BLD AUTO-RTO: 0 %
PLATELET # BLD AUTO: 220 X10(3)/MCL (ref 130–400)
PMV BLD AUTO: 11.5 FL (ref 7.4–10.4)
POTASSIUM SERPL-SCNC: 4 MMOL/L (ref 3.5–5.1)
PROT SERPL-MCNC: 7.9 GM/DL (ref 5.8–7.6)
RBC # BLD AUTO: 4.94 X10(6)/MCL (ref 4.2–5.4)
SODIUM SERPL-SCNC: 141 MMOL/L (ref 136–145)
TRIGL SERPL-MCNC: 219 MG/DL (ref 37–140)
TSH SERPL-ACNC: 1.05 UIU/ML (ref 0.35–4.94)
VLDLC SERPL CALC-MCNC: 44 MG/DL
WBC # BLD AUTO: 5.23 X10(3)/MCL (ref 4.5–11.5)

## 2025-07-10 PROCEDURE — 80053 COMPREHEN METABOLIC PANEL: CPT | Performed by: FAMILY MEDICINE

## 2025-07-10 PROCEDURE — 99213 OFFICE O/P EST LOW 20 MIN: CPT | Mod: PBBFAC | Performed by: FAMILY MEDICINE

## 2025-07-10 PROCEDURE — 84443 ASSAY THYROID STIM HORMONE: CPT | Performed by: FAMILY MEDICINE

## 2025-07-10 PROCEDURE — 85025 COMPLETE CBC W/AUTO DIFF WBC: CPT | Performed by: FAMILY MEDICINE

## 2025-07-10 PROCEDURE — 80061 LIPID PANEL: CPT | Performed by: FAMILY MEDICINE

## 2025-07-10 NOTE — PROGRESS NOTES
"Subjective:       Patient ID: Maricel Sepulveda is a 64 y.o. female.    Chief Complaint: Follow-up (Anxiety)      HPI  63 yo female with anxiety and hypothyroidism.  No new issues or concerns.  Will retire soon.   Review of Systems   Constitutional:  Negative for activity change.   HENT:  Negative for hearing loss and trouble swallowing.    Eyes:  Negative for discharge.   Respiratory:  Negative for chest tightness and wheezing.    Cardiovascular:  Negative for chest pain and palpitations.   Gastrointestinal:  Negative for constipation, diarrhea and vomiting.   Genitourinary:  Negative for difficulty urinating and hematuria.   Neurological:  Negative for headaches.   Psychiatric/Behavioral:  Positive for dysphoric mood.          Objective:       Vital Signs  Temp: 97.7 °F (36.5 °C)  Temp Source: Oral  Pulse: 64  SpO2: 98 %  BP: 123/73  BP Location: Right arm  Patient Position: Sitting  Pain Score: 0-No pain  Height and Weight  Height: 5' 4" (162.6 cm)  Weight: 91.1 kg (200 lb 12.8 oz)  BSA (Calculated - sq m): 2.03 sq meters  BMI (Calculated): 34.5  Weight in (lb) to have BMI = 25: 145.3]  Physical Exam  Vitals reviewed.   Constitutional:       Appearance: Normal appearance.   HENT:      Head: Normocephalic and atraumatic.   Eyes:      Extraocular Movements: Extraocular movements intact.      Conjunctiva/sclera: Conjunctivae normal.   Cardiovascular:      Rate and Rhythm: Normal rate and regular rhythm.      Heart sounds: Normal heart sounds.   Pulmonary:      Breath sounds: Normal breath sounds.   Skin:     General: Skin is warm and dry.   Neurological:      General: No focal deficit present.      Mental Status: She is alert.   Psychiatric:         Mood and Affect: Mood and affect normal.         Speech: Speech normal.         Behavior: Behavior normal. Behavior is cooperative.         Thought Content: Thought content does not include homicidal or suicidal ideation.         Assessment:       Problem List Items " Addressed This Visit          Psychiatric    Anxiety - Primary       Endocrine    Hypothyroidism       Plan:   Continue current medications  ER precautions  FU in 3 months